# Patient Record
Sex: FEMALE | Race: WHITE | NOT HISPANIC OR LATINO | Employment: FULL TIME | URBAN - METROPOLITAN AREA
[De-identification: names, ages, dates, MRNs, and addresses within clinical notes are randomized per-mention and may not be internally consistent; named-entity substitution may affect disease eponyms.]

---

## 2017-02-13 ENCOUNTER — ALLSCRIPTS OFFICE VISIT (OUTPATIENT)
Dept: OTHER | Facility: OTHER | Age: 42
End: 2017-02-13

## 2017-02-15 LAB
HPV 18 (HISTORICAL): NOT DETECTED
HPV HIGH RISK 16/18 (HISTORICAL): NOT DETECTED
HPV16 (HISTORICAL): NOT DETECTED
PAP (HISTORICAL): NORMAL

## 2018-01-09 NOTE — RESULT NOTES
Verified Results  (1) EXPOSURE PANEL - FOLLOW UP [id number] 07:01AM Stevie Singh Seat     Test Name Result Flag Reference   HIV 1/2 AND P24 Non-Reactive  Non-Reactive   This test detects HIV 1, HIV2 and p24 Antigen

## 2018-01-12 NOTE — RESULT NOTES
Verified Results  (1) EXPOSURE PANEL - FOLLOW UP [id number] 07:01AM Geovany Singh Miners     Test Name Result Flag Reference   ALT (SGPT) 27 U/L  12-78     (1) EXPOSURE PANEL - FOLLOW UP [id number] 07:01AM Geovany Singh [facility]     Test Name Result Flag Reference   HEPATITIS C ANTIBODY Non-reactive  Non-reactive   HEPATITIS B SURFACE ANTIGEN Non-reactive  Non-reactive, NonReactive - Confirmed

## 2018-01-13 VITALS
WEIGHT: 176 LBS | BODY MASS INDEX: 32.39 KG/M2 | SYSTOLIC BLOOD PRESSURE: 136 MMHG | HEIGHT: 62 IN | DIASTOLIC BLOOD PRESSURE: 90 MMHG

## 2018-01-13 NOTE — RESULT NOTES
Verified Results  (1) EXPOSURE PANEL - FOLLOW UP [id number] 01:30PM Delmonico, Deyanne Hashimoto     Test Name Result Flag Reference   ALT (SGPT) 20 U/L  12-78

## 2018-01-13 NOTE — RESULT NOTES
Verified Results  (1) EXPOSURE PANEL - FOLLOW UP [id number] 10:00AM Juan Pablo Singh     Test Name Result Flag Reference   HIV 1/2 AND P24 Non-reactive  Non-reactive   This test detects HIV 1, HIV2 and p24 Antigen

## 2018-01-15 NOTE — RESULT NOTES
Verified Results  (1) EXPOSURE PANEL - FOLLOW UP [id number] 10:00AM Yaya Singh     Test Name Result Flag Reference   ALT (SGPT) 20 U/L  12-78     (1) EXPOSURE PANEL - FOLLOW UP [id number] 10:00AM Yaya Singh     Test Name Result Flag Reference   HEPATITIS B SURFACE ANTIGEN Non-reactive  Non-reactive, NonReactive - Confirmed     (1) EXPOSURE PANEL - FOLLOW UP [id number] 10:00AM Yaya Singh     Test Name Result Flag Reference   HEPATITIS C ANTIBODY Non-reactive  Non-reactive

## 2018-01-18 NOTE — RESULT NOTES
Verified Results  (1) EXPOSURE PANEL - FOLLOW UP [id number] 01:30PM Delmonico, Deberah Joeayne     Test Name Result Flag Reference   HEPATITIS B SURFACE ANTIGEN Non-reactive  Non-reactive, NonReactive - Confirmed     (1) EXPOSURE PANEL - FOLLOW UP [id number] 01:30PM Delmonico, Deberah Cockayne     Test Name Result Flag Reference   HEPATITIS C ANTIBODY Non-reactive  Non-reactive     (1) EXPOSURE PANEL - FOLLOW UP [id number] 01:30PM Delricardo, Ming Diazayne   This test detects HIV 1, HIV2 and p24 Antigen       Test Name Result Flag Reference   HIV 1/2 AND P24 Non-reactive  Non-reactive

## 2018-03-26 ENCOUNTER — ANNUAL EXAM (OUTPATIENT)
Dept: OBGYN CLINIC | Facility: CLINIC | Age: 43
End: 2018-03-26
Payer: COMMERCIAL

## 2018-03-26 VITALS — SYSTOLIC BLOOD PRESSURE: 132 MMHG | WEIGHT: 174 LBS | DIASTOLIC BLOOD PRESSURE: 80 MMHG | BODY MASS INDEX: 31.83 KG/M2

## 2018-03-26 DIAGNOSIS — Z12.39 SCREENING FOR MALIGNANT NEOPLASM OF BREAST: ICD-10-CM

## 2018-03-26 DIAGNOSIS — N92.1 MENORRHAGIA WITH IRREGULAR CYCLE: ICD-10-CM

## 2018-03-26 DIAGNOSIS — N94.3 PMS (PREMENSTRUAL SYNDROME): ICD-10-CM

## 2018-03-26 DIAGNOSIS — Z01.419 ENCOUNTER FOR GYNECOLOGICAL EXAMINATION: ICD-10-CM

## 2018-03-26 DIAGNOSIS — Z12.4 PAP SMEAR FOR CERVICAL CANCER SCREENING: Primary | ICD-10-CM

## 2018-03-26 PROCEDURE — S0612 ANNUAL GYNECOLOGICAL EXAMINA: HCPCS | Performed by: OBSTETRICS & GYNECOLOGY

## 2018-03-26 RX ORDER — TRANEXAMIC ACID 650 1/1
2 TABLET ORAL 3 TIMES DAILY PRN
Qty: 30 TABLET | Refills: 6 | Status: SHIPPED | OUTPATIENT
Start: 2018-03-26 | End: 2019-04-01 | Stop reason: SDUPTHER

## 2018-03-26 RX ORDER — ALPRAZOLAM 0.25 MG/1
0.25 TABLET ORAL 2 TIMES DAILY PRN
Qty: 15 TABLET | Refills: 0 | Status: SHIPPED | OUTPATIENT
Start: 2018-03-26 | End: 2018-07-09 | Stop reason: SDUPTHER

## 2018-03-26 NOTE — PROGRESS NOTES
Subjective      Nikolai Brewer is a 38 y o  female  who presents for annual well woman exam  Periods are relatively regular every 28-32 days  Patient has had very significant symptoms with sees significant PMS poor sleep prior to her menses feeling that her brain is foggy increased fibrocystic breast discomfort  Menses are also getting heavier with clots and cramps  Patient counseled on multiple options patient prefers no OCPs due to concerns for breast cancer fully counseled, had a history of serotonin reaction to Zoloft, counseled on NSAIDs as well as Lysteda and Omega 3 supplements as well as getting blood work discussed also ablation and fully counseled  No intermenstrual bleeding, spotting, or discharge  Patient reports Yes  hot flashes/night sweats, No pain problems intercourse, No vaginal dryness, sleeping poorly      Current contraception: tubal ligation  History of abnormal Pap smear: yes - had 1 abnormal in the past with normal repeat   Family history of uterine or ovarian cancer: no  Regular self breast exam: yes  History of abnormal mammogram: no  Family history of breast cancer: no    Menstrual History:  OB History      Para Term  AB Living    2 2            SAB TAB Ectopic Multiple Live Births                     Obstetric Comments    Menarche-Age 8         Menarche age: 8    LMP  Mar 20, 2018    The following portions of the patient's history were reviewed and updated as appropriate: allergies, current medications, past family history, past medical history, past social history, past surgical history and problem list   Past Medical History:   Diagnosis Date    Breast disorder     Depression     Disease of thyroid gland     Hyperlipidemia     Other abnormal findings in urine 10/25/2006    Pancreatic pseudocyst 2011    Peptic ulcer     last assessed 16    Viral gastroenteritis 2010     Past Surgical History:   Procedure Laterality Date     SECTION      TUBAL LIGATION       OB History      Para Term  AB Living    2 2            SAB TAB Ectopic Multiple Live Births                     Obstetric Comments    Menarche-Age 13          Review of Systems  Review of Systems   Constitutional: Negative for fatigue, fever and unexpected weight change  HENT: Negative for dental problem, mouth sores, nosebleeds, rhinorrhea, sinus pain, sinus pressure and sore throat  Eyes: Negative for pain, discharge and visual disturbance  Respiratory: Negative for cough, chest tightness, shortness of breath and wheezing  Cardiovascular: Negative for chest pain, palpitations and leg swelling  Gastrointestinal: Negative for blood in stool, constipation, diarrhea, nausea and vomiting  Endocrine: Positive for heat intolerance  Negative for polydipsia  Genitourinary: Positive for menstrual problem  Negative for difficulty urinating, dyspareunia, dysuria, pelvic pain, urgency, vaginal discharge and vaginal pain  Musculoskeletal: Negative for arthralgias, back pain and joint swelling  Allergic/Immunologic: Negative for environmental allergies  Neurological: Negative for seizures, light-headedness and headaches  Migraines   Hematological: Does not bruise/bleed easily  Psychiatric/Behavioral: Negative for sleep disturbance  The patient is nervous/anxious           PMS             Objective         Vitals:    18 0800   BP: 132/80         General:   alert and oriented, in no acute distress, alert, appears stated age and cooperative   Heart: regular rate and rhythm, S1, S2 normal, no murmur, click, rub or gallop   Lungs: clear to auscultation bilaterally   Abdomen: soft, non-tender, without masses or organomegaly   Vulva: normal   Vagina: normal mucosa   Cervix: no cervical motion tenderness and no lesions   Uterus: normal size, mobile, non-tender   Adnexa: no mass, fullness, tenderness   inspection negative, no nipple discharge or bleeding, no masses or nodularity palpable  deferred  Pupils equal round reactive to light and accommodation  Throat clear no lesions or findings  Thyroid normal no masses or nodules            Assessment   38year-old  with heavy menses with clots and cramps as well as PMS and mood swings  Patient also feels that her thyroid symptoms were worse after stopping her metoprolol patient has a history of hot flashes palpitations and X up the most which had improved on the metoprolol  Patient has restarted it and we reviewed going for blood work she has also noted some jama menopausal symptoms like mood swings hair growth poor sleep  Plan   Thin prep with Co testing performed, patient given slip for CBC diff, TSH, free T4, iron, ferritin, TPO AB, bursae 3, TIBC, FSH, LH, free testosterone  Patient had a normal CMP not long ago  Patient also given slip for screening mammogram with 3D and CAD  Patient given script for 15 tablets of Xanax to use until her symptoms are improved and as needed  Patient also given script for Lysteda patient to call and return if needs further intervention regarding menses and PMS  Otherwise patient should return in 1 year

## 2018-03-28 LAB
CYTOLOGIST CVX/VAG CYTO: NORMAL
DX ICD CODE: NORMAL
HPV I/H RISK 1 DNA CVX QL PROBE+SIG AMP: NEGATIVE
OTHER STN SPEC: NORMAL
PATH REPORT.FINAL DX SPEC: NORMAL
SL AMB NOTE:: NORMAL
SL AMB SPECIMEN ADEQUACY: NORMAL

## 2018-03-30 ENCOUNTER — HOSPITAL ENCOUNTER (OUTPATIENT)
Dept: MAMMOGRAPHY | Facility: HOSPITAL | Age: 43
Discharge: HOME | End: 2018-03-30
Attending: OBSTETRICS & GYNECOLOGY
Payer: COMMERCIAL

## 2018-03-30 DIAGNOSIS — Z12.39 SCREENING FOR MALIGNANT NEOPLASM OF BREAST: ICD-10-CM

## 2018-03-30 LAB
BASOPHILS # BLD AUTO: 0 X10E3/UL (ref 0–0.2)
BASOPHILS NFR BLD AUTO: 1 %
EOSINOPHIL # BLD AUTO: 0.1 X10E3/UL (ref 0–0.4)
EOSINOPHIL NFR BLD AUTO: 2 %
ERYTHROCYTE [DISTWIDTH] IN BLOOD BY AUTOMATED COUNT: 14.1 % (ref 12.3–15.4)
FSH SERPL-ACNC: 5.2 MIU/ML
HCT VFR BLD AUTO: 37.6 % (ref 34–46.6)
HGB BLD-MCNC: 12.2 G/DL (ref 11.1–15.9)
IMM GRANULOCYTES # BLD: 0 X10E3/UL (ref 0–0.1)
IMM GRANULOCYTES NFR BLD: 0 %
IRON SATN MFR SERPL: 12 % (ref 15–55)
IRON SERPL-MCNC: 49 UG/DL (ref 27–159)
LH SERPL-ACNC: 4.7 MIU/ML
LYMPHOCYTES # BLD AUTO: 1.8 X10E3/UL (ref 0.7–3.1)
LYMPHOCYTES NFR BLD AUTO: 29 %
MCH RBC QN AUTO: 27.5 PG (ref 26.6–33)
MCHC RBC AUTO-ENTMCNC: 32.4 G/DL (ref 31.5–35.7)
MCV RBC AUTO: 85 FL (ref 79–97)
MONOCYTES # BLD AUTO: 0.4 X10E3/UL (ref 0.1–0.9)
MONOCYTES NFR BLD AUTO: 6 %
NEUTROPHILS # BLD AUTO: 3.8 X10E3/UL (ref 1.4–7)
NEUTROPHILS NFR BLD AUTO: 62 %
PLATELET # BLD AUTO: 304 X10E3/UL (ref 150–379)
RBC # BLD AUTO: 4.43 X10E6/UL (ref 3.77–5.28)
T3REVERSE SERPL-MCNC: 14.2 NG/DL (ref 9.2–24.1)
T4 FREE SERPL-MCNC: 1.05 NG/DL (ref 0.82–1.77)
TESTOST FREE SERPL-MCNC: 2.6 PG/ML (ref 0–4.2)
TESTOST SERPL-MCNC: 20 NG/DL (ref 8–48)
THYROPEROXIDASE AB SERPL-ACNC: 15 IU/ML (ref 0–34)
TIBC SERPL-MCNC: 394 UG/DL (ref 250–450)
TSH SERPL DL<=0.005 MIU/L-ACNC: 2.09 UIU/ML (ref 0.45–4.5)
UIBC SERPL-MCNC: 345 UG/DL (ref 131–425)
WBC # BLD AUTO: 6.1 X10E3/UL (ref 3.4–10.8)

## 2018-03-30 PROCEDURE — 77063 BREAST TOMOSYNTHESIS BI: CPT

## 2018-03-30 PROCEDURE — 77067 SCR MAMMO BI INCL CAD: CPT

## 2018-07-09 ENCOUNTER — TELEPHONE (OUTPATIENT)
Dept: OBGYN CLINIC | Facility: CLINIC | Age: 43
End: 2018-07-09

## 2018-07-09 DIAGNOSIS — N94.3 PMS (PREMENSTRUAL SYNDROME): ICD-10-CM

## 2018-07-09 NOTE — TELEPHONE ENCOUNTER
Had annual and got trial of xanax 0 25 for pms symptoms and looking for refill  20 pills lasted last few months

## 2018-07-11 RX ORDER — ALPRAZOLAM 0.25 MG/1
0.25 TABLET ORAL 2 TIMES DAILY PRN
Qty: 15 TABLET | Refills: 0 | Status: SHIPPED | OUTPATIENT
Start: 2018-07-11 | End: 2019-04-01 | Stop reason: SDUPTHER

## 2018-07-24 ENCOUNTER — TELEPHONE (OUTPATIENT)
Dept: OBGYN CLINIC | Facility: CLINIC | Age: 43
End: 2018-07-24

## 2018-07-24 NOTE — TELEPHONE ENCOUNTER
Calling about xanax prescription to [facility]  Pharmacy does not have the fill   Did it go through or do I need to come  rx

## 2018-12-27 DIAGNOSIS — Z12.39 SCREENING FOR MALIGNANT NEOPLASM OF BREAST: Primary | ICD-10-CM

## 2019-04-01 ENCOUNTER — ANNUAL EXAM (OUTPATIENT)
Dept: OBGYN CLINIC | Facility: CLINIC | Age: 44
End: 2019-04-01
Payer: COMMERCIAL

## 2019-04-01 VITALS
WEIGHT: 177 LBS | HEIGHT: 65 IN | BODY MASS INDEX: 29.49 KG/M2 | DIASTOLIC BLOOD PRESSURE: 70 MMHG | SYSTOLIC BLOOD PRESSURE: 120 MMHG

## 2019-04-01 DIAGNOSIS — N92.1 MENORRHAGIA WITH IRREGULAR CYCLE: ICD-10-CM

## 2019-04-01 DIAGNOSIS — N94.3 PMS (PREMENSTRUAL SYNDROME): ICD-10-CM

## 2019-04-01 DIAGNOSIS — Z01.419 ENCOUNTER FOR GYNECOLOGICAL EXAMINATION WITHOUT ABNORMAL FINDING: Primary | ICD-10-CM

## 2019-04-01 PROCEDURE — 99396 PREV VISIT EST AGE 40-64: CPT | Performed by: OBSTETRICS & GYNECOLOGY

## 2019-04-01 RX ORDER — ROSUVASTATIN CALCIUM 5 MG/1
5 TABLET, COATED ORAL DAILY
COMMUNITY
Start: 2018-10-09 | End: 2021-09-13

## 2019-04-01 RX ORDER — ALPRAZOLAM 0.25 MG/1
0.25 TABLET ORAL 2 TIMES DAILY PRN
Qty: 15 TABLET | Refills: 0 | Status: SHIPPED | OUTPATIENT
Start: 2019-04-01 | End: 2019-08-09 | Stop reason: SDUPTHER

## 2019-04-01 RX ORDER — TRANEXAMIC ACID 650 1/1
2 TABLET ORAL 3 TIMES DAILY PRN
Qty: 30 TABLET | Refills: 6 | Status: SHIPPED | OUTPATIENT
Start: 2019-04-01 | End: 2020-06-01 | Stop reason: SDUPTHER

## 2019-04-02 LAB
CYTOLOGIST CVX/VAG CYTO: NORMAL
DX ICD CODE: NORMAL
OTHER STN SPEC: NORMAL
OTHER STN SPEC: NORMAL
PATH REPORT.FINAL DX SPEC: NORMAL
SL AMB NOTE:: NORMAL
SL AMB SPECIMEN ADEQUACY: NORMAL
SL AMB TEST METHODOLOGY: NORMAL

## 2019-05-07 ENCOUNTER — TRANSCRIBE ORDERS (OUTPATIENT)
Dept: ADMINISTRATIVE | Facility: HOSPITAL | Age: 44
End: 2019-05-07

## 2019-05-07 DIAGNOSIS — R10.11 ABDOMINAL PAIN, RIGHT UPPER QUADRANT: Primary | ICD-10-CM

## 2019-05-08 ENCOUNTER — HOSPITAL ENCOUNTER (OUTPATIENT)
Dept: RADIOLOGY | Facility: HOSPITAL | Age: 44
Discharge: HOME | End: 2019-05-08
Attending: FAMILY MEDICINE
Payer: COMMERCIAL

## 2019-05-08 DIAGNOSIS — R10.11 ABDOMINAL PAIN, RIGHT UPPER QUADRANT: ICD-10-CM

## 2019-05-08 PROCEDURE — 76705 ECHO EXAM OF ABDOMEN: CPT

## 2019-06-04 ENCOUNTER — HOSPITAL ENCOUNTER (OUTPATIENT)
Dept: RADIOLOGY | Facility: HOSPITAL | Age: 44
Discharge: HOME | End: 2019-06-04
Attending: OBSTETRICS & GYNECOLOGY
Payer: COMMERCIAL

## 2019-06-04 VITALS — HEIGHT: 62 IN | BODY MASS INDEX: 31.65 KG/M2 | WEIGHT: 172 LBS

## 2019-06-04 DIAGNOSIS — Z12.39 SCREENING FOR MALIGNANT NEOPLASM OF BREAST: ICD-10-CM

## 2019-06-04 PROCEDURE — 77063 BREAST TOMOSYNTHESIS BI: CPT

## 2019-06-04 PROCEDURE — 77067 SCR MAMMO BI INCL CAD: CPT

## 2019-06-05 ENCOUNTER — HOSPITAL ENCOUNTER (OUTPATIENT)
Dept: RADIOLOGY | Facility: HOSPITAL | Age: 44
Discharge: HOME | End: 2019-06-05
Payer: COMMERCIAL

## 2019-06-05 VITALS — BODY MASS INDEX: 31.46 KG/M2 | WEIGHT: 172 LBS

## 2019-06-05 DIAGNOSIS — R92.8 ABNORMALITY OF LEFT BREAST ON SCREENING MAMMOGRAM: Primary | ICD-10-CM

## 2019-06-05 DIAGNOSIS — R92.8 ABNORMALITY OF RIGHT BREAST ON SCREENING MAMMOGRAM: ICD-10-CM

## 2019-06-05 DIAGNOSIS — R10.9 ABDOMINAL PAIN: ICD-10-CM

## 2019-06-05 PROCEDURE — 78227 HEPATOBIL SYST IMAGE W/DRUG: CPT

## 2019-06-05 PROCEDURE — A9537 TC99M MEBROFENIN: HCPCS

## 2019-06-10 ENCOUNTER — HOSPITAL ENCOUNTER (OUTPATIENT)
Dept: ULTRASOUND IMAGING | Facility: CLINIC | Age: 44
Discharge: HOME | End: 2019-06-10
Attending: OBSTETRICS & GYNECOLOGY
Payer: COMMERCIAL

## 2019-06-10 ENCOUNTER — HOSPITAL ENCOUNTER (OUTPATIENT)
Dept: MAMMOGRAPHY | Facility: CLINIC | Age: 44
Discharge: HOME | End: 2019-06-10
Attending: OBSTETRICS & GYNECOLOGY
Payer: COMMERCIAL

## 2019-06-10 VITALS — HEIGHT: 62 IN | BODY MASS INDEX: 31.65 KG/M2 | WEIGHT: 172 LBS

## 2019-06-10 DIAGNOSIS — R92.8 ABNORMALITY OF RIGHT BREAST ON SCREENING MAMMOGRAM: ICD-10-CM

## 2019-06-10 PROCEDURE — 77065 DX MAMMO INCL CAD UNI: CPT

## 2019-06-10 PROCEDURE — 76642 ULTRASOUND BREAST LIMITED: CPT

## 2019-06-10 PROCEDURE — G0279 TOMOSYNTHESIS, MAMMO: HCPCS

## 2019-08-09 DIAGNOSIS — N94.3 PMS (PREMENSTRUAL SYNDROME): ICD-10-CM

## 2019-08-09 RX ORDER — ALPRAZOLAM 0.25 MG/1
0.25 TABLET ORAL 2 TIMES DAILY PRN
Qty: 15 TABLET | Refills: 0 | Status: SHIPPED | OUTPATIENT
Start: 2019-08-09 | End: 2020-06-01 | Stop reason: SDUPTHER

## 2020-03-05 ENCOUNTER — TRANSCRIBE ORDERS (OUTPATIENT)
Dept: ADMINISTRATIVE | Facility: HOSPITAL | Age: 45
End: 2020-03-05

## 2020-03-05 DIAGNOSIS — Z12.31 ENCOUNTER FOR SCREENING MAMMOGRAM FOR MALIGNANT NEOPLASM OF BREAST: Primary | ICD-10-CM

## 2020-06-01 DIAGNOSIS — N94.3 PMS (PREMENSTRUAL SYNDROME): ICD-10-CM

## 2020-06-01 DIAGNOSIS — Z12.39 SCREENING FOR MALIGNANT NEOPLASM OF BREAST: Primary | ICD-10-CM

## 2020-06-01 DIAGNOSIS — N92.1 MENORRHAGIA WITH IRREGULAR CYCLE: ICD-10-CM

## 2020-06-01 RX ORDER — TRANEXAMIC ACID 650 1/1
2 TABLET ORAL 3 TIMES DAILY PRN
Qty: 30 TABLET | Refills: 6 | Status: SHIPPED | OUTPATIENT
Start: 2020-06-01 | End: 2021-07-20 | Stop reason: SDUPTHER

## 2020-06-01 RX ORDER — ALPRAZOLAM 0.25 MG/1
0.25 TABLET ORAL 2 TIMES DAILY PRN
Qty: 15 TABLET | Refills: 0 | Status: SHIPPED | OUTPATIENT
Start: 2020-06-01 | End: 2021-01-11 | Stop reason: SDUPTHER

## 2020-07-08 ENCOUNTER — TRANSCRIBE ORDERS (OUTPATIENT)
Dept: ADMINISTRATIVE | Facility: HOSPITAL | Age: 45
End: 2020-07-08

## 2020-07-08 ENCOUNTER — HOSPITAL ENCOUNTER (OUTPATIENT)
Dept: MAMMOGRAPHY | Facility: HOSPITAL | Age: 45
Discharge: HOME | End: 2020-07-08
Attending: OBSTETRICS & GYNECOLOGY
Payer: COMMERCIAL

## 2020-07-08 VITALS — WEIGHT: 172 LBS | BODY MASS INDEX: 31.65 KG/M2 | HEIGHT: 62 IN

## 2020-07-08 DIAGNOSIS — Z12.31 ENCOUNTER FOR SCREENING MAMMOGRAM FOR MALIGNANT NEOPLASM OF BREAST: ICD-10-CM

## 2020-07-08 PROCEDURE — 77063 BREAST TOMOSYNTHESIS BI: CPT

## 2020-07-08 PROCEDURE — 77067 SCR MAMMO BI INCL CAD: CPT

## 2020-08-06 ENCOUNTER — ANNUAL EXAM (OUTPATIENT)
Dept: OBGYN CLINIC | Facility: CLINIC | Age: 45
End: 2020-08-06
Payer: COMMERCIAL

## 2020-08-06 VITALS
WEIGHT: 176 LBS | BODY MASS INDEX: 32.19 KG/M2 | DIASTOLIC BLOOD PRESSURE: 90 MMHG | TEMPERATURE: 98.7 F | SYSTOLIC BLOOD PRESSURE: 140 MMHG

## 2020-08-06 DIAGNOSIS — Z01.419 ENCOUNTER FOR GYNECOLOGICAL EXAMINATION WITHOUT ABNORMAL FINDING: Primary | ICD-10-CM

## 2020-08-06 DIAGNOSIS — Z12.39 SCREENING FOR MALIGNANT NEOPLASM OF BREAST: ICD-10-CM

## 2020-08-06 PROCEDURE — 99396 PREV VISIT EST AGE 40-64: CPT | Performed by: OBSTETRICS & GYNECOLOGY

## 2020-08-06 NOTE — PROGRESS NOTES
Subjective      Kiki Galvan is a 45 y o   female who presents for annual well woman exam   Patient's son is in high school and her daughter is in 8th grade  Periods are regular every 28-30 days, lasting 7 days  somewhat heavy and crampy but overall tolerable discussed again options to treat  Patient reports omega-3 supplement is helping with PMS  No intermenstrual bleeding, spotting, or discharge  Patient reports Yes  hot flashes/night sweats, No pain problems intercourse, Yes  vaginal dryness, sleeping fairly well  Current contraception: tubal ligation  History of abnormal Pap smear: yes - had 1 abnormal in the past with normal repeat   Family history of uterine or ovarian cancer: no  Regular self breast exam: yes  History of abnormal mammogram: no  Family history of breast cancer: no    Menstrual History:  OB History        4    Para   4    Term   2            AB        Living           SAB        TAB        Ectopic        Multiple        Live Births               Obstetric Comments   Menarche-Age 8            Menarche age: 8  No LMP recorded         The following portions of the patient's history were reviewed and updated as appropriate: allergies, current medications, past family history, past medical history, past social history, past surgical history and problem list   Past Medical History:   Diagnosis Date    Breast disorder     Depression     Disease of thyroid gland     Hyperlipidemia     Other abnormal findings in urine 10/25/2006    Pancreatic pseudocyst 2011    Peptic ulcer     last assessed 16    Viral gastroenteritis 2010     Past Surgical History:   Procedure Laterality Date     SECTION      TUBAL LIGATION       OB History        4    Para   4    Term   2            AB        Living           SAB        TAB        Ectopic        Multiple        Live Births               Obstetric Comments   Menarche-Age 13             Review of Systems  Review of Systems   Constitutional: Negative for fatigue, fever and unexpected weight change  HENT: Negative for dental problem, sinus pressure and sinus pain  Eyes: Negative for visual disturbance  Respiratory: Negative for cough, shortness of breath and wheezing  Cardiovascular: Negative for chest pain and leg swelling  Gastrointestinal: Negative for blood in stool, constipation, diarrhea, nausea and vomiting  Endocrine: Negative for cold intolerance, heat intolerance and polydipsia  Genitourinary: Negative for dysuria, frequency, hematuria, menstrual problem and pelvic pain  Musculoskeletal: Negative for arthralgias and back pain  Neurological: Negative for dizziness, seizures and headaches  Psychiatric/Behavioral: The patient is not nervous/anxious  Objective     Vitals:    20 0907   BP: 140/90   Temp: 98 7 °F (37 1 °C)   Weight: 79 8 kg (176 lb)       General:   alert and oriented, in no acute distress   Heart: regular rate and rhythm, S1, S2 normal, no murmur, click, rub or gallop   Lungs: clear to auscultation bilaterally   Abdomen: soft, non-tender, without masses or organomegaly   Vulva: normal   Vagina: normal mucosa   Cervix: no bleeding following Pap, no cervical motion tenderness and no lesions   Uterus: normal size, mobile, non-tender   Adnexa: normal adnexa and no mass, fullness, tenderness   Breast inspection negative, no nipple discharge or bleeding, no masses or nodularity palpable  Rectal negative, stool guaiac negative  Thyroid normal no masses or nodules     Assessment   45year-old  here for annual exam started some Crestor overall doing well menses slightly heavy but overall controlled       Plan   Thin prep with Co testing performed, given slip for mammogram, patient will call if she needs additional Xanax use is very limited return in 1 year or sooner as needed

## 2020-08-08 LAB
CYTOLOGIST CVX/VAG CYTO: NORMAL
DX ICD CODE: NORMAL
HPV I/H RISK 1 DNA CVX QL PROBE+SIG AMP: NEGATIVE
HPV LOW RISK DNA CVX QL PROBE+SIG AMP: NEGATIVE
OTHER STN SPEC: NORMAL
PATH REPORT.FINAL DX SPEC: NORMAL
SL AMB NOTE:: NORMAL
SL AMB SPECIMEN ADEQUACY: NORMAL
SL AMB TEST METHODOLOGY: NORMAL

## 2021-01-11 DIAGNOSIS — N94.3 PMS (PREMENSTRUAL SYNDROME): ICD-10-CM

## 2021-01-11 RX ORDER — ALPRAZOLAM 0.25 MG/1
0.25 TABLET ORAL 2 TIMES DAILY PRN
Qty: 15 TABLET | Refills: 0 | Status: SHIPPED | OUTPATIENT
Start: 2021-01-11 | End: 2021-07-20 | Stop reason: SDUPTHER

## 2021-01-11 NOTE — TELEPHONE ENCOUNTER
Patient called stating she needs a refill on her Xanax that she takes as needed for hormones      CMA

## 2021-07-20 DIAGNOSIS — N92.1 MENORRHAGIA WITH IRREGULAR CYCLE: ICD-10-CM

## 2021-07-20 DIAGNOSIS — N94.3 PMS (PREMENSTRUAL SYNDROME): ICD-10-CM

## 2021-07-20 RX ORDER — ALPRAZOLAM 0.25 MG/1
0.25 TABLET ORAL 2 TIMES DAILY PRN
Qty: 15 TABLET | Refills: 0 | Status: SHIPPED | OUTPATIENT
Start: 2021-07-20 | End: 2022-07-21 | Stop reason: SDUPTHER

## 2021-07-20 RX ORDER — TRANEXAMIC ACID 650 1/1
1300 TABLET ORAL 3 TIMES DAILY PRN
Qty: 30 TABLET | Refills: 1 | Status: SHIPPED | OUTPATIENT
Start: 2021-07-20 | End: 2022-07-21 | Stop reason: SDUPTHER

## 2021-09-13 ENCOUNTER — ANNUAL EXAM (OUTPATIENT)
Dept: OBGYN CLINIC | Facility: CLINIC | Age: 46
End: 2021-09-13
Payer: COMMERCIAL

## 2021-09-13 DIAGNOSIS — Z01.419 ROUTINE GYNECOLOGICAL EXAMINATION: Primary | ICD-10-CM

## 2021-09-13 DIAGNOSIS — Z12.4 ENCOUNTER FOR PAP SMEAR OF CERVIX WITH HPV DNA COTESTING: ICD-10-CM

## 2021-09-13 PROCEDURE — 99396 PREV VISIT EST AGE 40-64: CPT | Performed by: OBSTETRICS & GYNECOLOGY

## 2021-09-13 NOTE — PROGRESS NOTES
Alessandro Bolivar is a 39 y o   female who presents for annual well woman exam   Her son is a senior in high school and her daughter is a freshman in high school  Periods are regular every 21 days, lasting 7 days  Somewhat heavy, managing pms with xanax and heavy bleeding with TXA  Aware of additional options to address symptoms  Patient has migraines with Aura, discussed would not recommend estrogen products, has follow up with neurology  No intermenstrual bleeding, spotting, or discharge  Patient reports Yes  hot flashes/night sweats, Yes  pain problems intercourse, Yes  vaginal dryness reviewed lubricants and options, sleeping not as well  Has decreased weight using weight watchers  Patient counseled on guidelines, has h/o abnormal and strongly desires pap today  Current contraception: tubal ligation  History of abnormal Pap smear: yes - h/o abnormal in the past, recent normal  Family history of uterine or ovarian cancer: no  Regular self breast exam: yes  History of abnormal mammogram: no  Family history of breast cancer: no    Menstrual History:  OB History        4    Para   4    Term   2            AB        Living           SAB        TAB        Ectopic        Multiple        Live Births               Obstetric Comments   Menarche-Age 8            Menarche age: 8  Patient's last menstrual period was 2021 (approximate)         The following portions of the patient's history were reviewed and updated as appropriate: allergies, current medications, past family history, past medical history, past social history, past surgical history and problem list   Past Medical History:   Diagnosis Date    Breast disorder     Depression     Disease of thyroid gland     Hyperlipidemia     Other abnormal findings in urine 10/25/2006    Pancreatic pseudocyst 2011    Peptic ulcer     last assessed 16    Viral gastroenteritis 2010     Past Surgical History:   Procedure Laterality Date     SECTION      CHOLECYSTECTOMY  2019    TUBAL LIGATION       OB History        4    Para   4    Term   2            AB        Living           SAB        TAB        Ectopic        Multiple        Live Births               Obstetric Comments   Menarche-Age 13             Review of Systems  Review of Systems   Constitutional: Negative for chills and fever  HENT: Negative for ear pain and sore throat  Eyes: Negative for pain and visual disturbance  Respiratory: Negative for cough and shortness of breath  Cardiovascular: Negative for chest pain and palpitations  Gastrointestinal: Negative for abdominal pain and vomiting  Genitourinary: Positive for menstrual problem  Negative for dysuria and hematuria  Heavy periods   Musculoskeletal: Negative for arthralgias and back pain  Skin: Negative for color change and rash  Neurological: Negative for seizures and syncope  All other systems reviewed and are negative              Objective      Temp (!) 97 4 °F (36 3 °C) (Temporal)   Ht 5' 2" (1 575 m)   Wt 74 8 kg (165 lb)   LMP 2021 (Approximate)   BMI 30 18 kg/m²   Vitals:    21 1352   Temp: (!) 97 4 °F (36 3 °C)   TempSrc: Temporal   Weight: 74 8 kg (165 lb)   Height: 5' 2" (1 575 m)   BP: 130/78    General:   alert and oriented, in no acute distress   Heart: regular rate and rhythm, S1, S2 normal, no murmur, click, rub or gallop   Lungs: clear to auscultation bilaterally   Abdomen: soft, non-tender, without masses or organomegaly   Vulva: normal   Vagina: normal mucosa   Cervix: no bleeding following Pap, no cervical motion tenderness and no lesions   Uterus: normal size, mobile, non-tender   Adnexa: normal adnexa and no mass, fullness, tenderness   Breast inspection negative, no nipple discharge or bleeding, no masses or nodularity palpable  Rectal negative, stool guaiac negative  Thyroid normal no masses or nodules     Assessment   38 y/o  here for annual, menses somewhat heavier and closer together, some PMS, managing with xanax rarely as needed and TXA  Has migraine with aura  Aware of options  Will consider and return for discussion if decides to proceed with surgical intervention  Last mammogram 2020, scheduled for next one  Plan   Thin prep with cotesting  Patient counseled, strongly desires pap due to h/o abnormal   Given slip for mammogram, return in 1 year or sooner as needed

## 2021-09-16 VITALS
BODY MASS INDEX: 30.36 KG/M2 | SYSTOLIC BLOOD PRESSURE: 130 MMHG | WEIGHT: 165 LBS | HEIGHT: 62 IN | DIASTOLIC BLOOD PRESSURE: 78 MMHG | TEMPERATURE: 97.4 F

## 2021-09-16 LAB
CYTOLOGIST CVX/VAG CYTO: NORMAL
DX ICD CODE: NORMAL
HPV I/H RISK 4 DNA CVX QL PROBE+SIG AMP: NEGATIVE
OTHER STN SPEC: NORMAL
PATH REPORT.FINAL DX SPEC: NORMAL
SL AMB NOTE:: NORMAL
SL AMB SPECIMEN ADEQUACY: NORMAL
SL AMB TEST METHODOLOGY: NORMAL

## 2021-11-08 ENCOUNTER — TELEPHONE (OUTPATIENT)
Dept: OBGYN CLINIC | Facility: CLINIC | Age: 46
End: 2021-11-08

## 2021-11-15 ENCOUNTER — HOSPITAL ENCOUNTER (OUTPATIENT)
Dept: MAMMOGRAPHY | Facility: HOSPITAL | Age: 46
Discharge: HOME | End: 2021-11-15
Attending: OBSTETRICS & GYNECOLOGY
Payer: COMMERCIAL

## 2021-11-15 VITALS — BODY MASS INDEX: 30.35 KG/M2 | HEIGHT: 62 IN | WEIGHT: 164.9 LBS

## 2021-11-15 DIAGNOSIS — Z12.39 SCREENING FOR MALIGNANT NEOPLASM OF BREAST: ICD-10-CM

## 2021-11-15 PROCEDURE — 77067 SCR MAMMO BI INCL CAD: CPT

## 2021-11-15 PROCEDURE — 77063 BREAST TOMOSYNTHESIS BI: CPT

## 2022-07-21 DIAGNOSIS — N94.3 PMS (PREMENSTRUAL SYNDROME): ICD-10-CM

## 2022-07-21 DIAGNOSIS — N92.1 MENORRHAGIA WITH IRREGULAR CYCLE: ICD-10-CM

## 2022-07-21 RX ORDER — TRANEXAMIC ACID 650 MG/1
1300 TABLET ORAL 3 TIMES DAILY PRN
Qty: 30 TABLET | Refills: 1 | Status: SHIPPED | OUTPATIENT
Start: 2022-07-21

## 2022-07-21 RX ORDER — ALPRAZOLAM 0.25 MG/1
0.25 TABLET ORAL 2 TIMES DAILY PRN
Qty: 15 TABLET | Refills: 0 | Status: SHIPPED | OUTPATIENT
Start: 2022-07-21

## 2022-07-21 NOTE — TELEPHONE ENCOUNTER
Pt called stating she needs refill on her tranexamic acid and Xanax - Prescribed by usually Dr Demond Correa

## 2022-09-02 DIAGNOSIS — Z12.31 ENCOUNTER FOR SCREENING MAMMOGRAM FOR BREAST CANCER: Primary | ICD-10-CM

## 2022-09-19 ENCOUNTER — ANNUAL EXAM (OUTPATIENT)
Dept: OBGYN CLINIC | Facility: CLINIC | Age: 47
End: 2022-09-19
Payer: COMMERCIAL

## 2022-09-19 VITALS — WEIGHT: 170 LBS | DIASTOLIC BLOOD PRESSURE: 92 MMHG | BODY MASS INDEX: 31.09 KG/M2 | SYSTOLIC BLOOD PRESSURE: 130 MMHG

## 2022-09-19 DIAGNOSIS — Z01.419 ROUTINE GYNECOLOGICAL EXAMINATION: Primary | ICD-10-CM

## 2022-09-19 PROCEDURE — 99396 PREV VISIT EST AGE 40-64: CPT | Performed by: OBSTETRICS & GYNECOLOGY

## 2022-09-19 PROCEDURE — 0503F POSTPARTUM CARE VISIT: CPT | Performed by: OBSTETRICS & GYNECOLOGY

## 2022-09-19 NOTE — PROGRESS NOTES
Subjective      Santhosh Nath is a 42 y o   female who presents for annual well woman exam  Periods are regular every 21 days, lasting 7 days  Controlled with Xanax and TXA  Patient is satisfied with symptom control at this time  No intermenstrual bleeding, spotting, or discharge  Patient reports Yes  hot flashes/night sweats, No pain problems intercourse, possibly mild vaginal dryness, sleeping fairly well, insomnia prior to menses  Has started Ubrelvy for migraines with improved symptom control  Her son is attending community college and doing well her daughter is in high school  Current contraception: tubal ligation  History of abnormal Pap smear: yes - history of abnormal in the past recent Sir normal  Family history of uterine or ovarian cancer: no  Regular self breast exam: yes  History of abnormal mammogram: no  Family history of breast cancer: no    Menstrual History:  OB History        4    Para   4    Term   2            AB        Living   2       SAB        IAB        Ectopic        Multiple        Live Births   2           Obstetric Comments   Menarche-Age 8            Menarche age: 8  Patient's last menstrual period was 2022 (exact date)         The following portions of the patient's history were reviewed and updated as appropriate: allergies, current medications, past family history, past medical history, past social history, past surgical history and problem list   Past Medical History:   Diagnosis Date    Breast disorder     Depression     Disease of thyroid gland     Hyperlipidemia     Other abnormal findings in urine 10/25/2006    Pancreatic pseudocyst 2011    Peptic ulcer     last assessed 16    Viral gastroenteritis 2010     Past Surgical History:   Procedure Laterality Date     SECTION      CHOLECYSTECTOMY  2019    TUBAL LIGATION       OB History        4    Para   4    Term   2            AB Living   2       SAB        IAB        Ectopic        Multiple        Live Births   2           Obstetric Comments   Menarche-Age 13             Review of Systems  Review of Systems   Constitutional: Negative for fatigue, fever and unexpected weight change  HENT: Negative for dental problem, sinus pressure and sinus pain  Eyes: Negative for visual disturbance  Respiratory: Negative for cough, shortness of breath and wheezing  Cardiovascular: Negative for chest pain and leg swelling  Gastrointestinal: Negative for blood in stool, constipation, diarrhea, nausea and vomiting  Endocrine: Negative for cold intolerance, heat intolerance and polydipsia  Genitourinary: Positive for menstrual problem  Negative for dysuria, frequency, hematuria and pelvic pain  Periods are starting to become irregular   Musculoskeletal: Negative for arthralgias and back pain  Neurological: Negative for dizziness, seizures and headaches  Psychiatric/Behavioral: The patient is not nervous/anxious                Objective     Vitals:    22 0808   BP: 130/92   BP Location: Right arm   Patient Position: Sitting   Weight: 77 1 kg (170 lb)     General:   alert and oriented, in no acute distress   Heart: regular rate and rhythm, S1, S2 normal, no murmur, click, rub or gallop   Lungs: clear to auscultation bilaterally   Abdomen: soft, non-tender, without masses or organomegaly   Vulva: normal   Vagina: normal mucosa   Cervix: no bleeding following Pap, no cervical motion tenderness and no lesions   Uterus: normal size, mobile, non-tender   Adnexa: normal adnexa and no mass, fullness, tenderness   Breast inspection negative, no nipple discharge or bleeding, no masses or nodularity palpable  Rectal negative, stool guaiac negative  Thyroid bulky no masses or nodules palpable (patient follows with her primary care physician for thyroid reported to have nodules)     Assessment   30-year-old  here for annual exam reviewed [facility]P guidelines desires Pap evaluation history of abnormal   Last Pap September 2021 normal, last mammogram November 2021 normal     Plan   Thin prep with Co testing performed, heavy menses controlled with current medication regimen, given slip for mammogram   Return in 1 year or sooner as needed

## 2022-09-24 LAB
CYTOLOGIST CVX/VAG CYTO: ABNORMAL
DX ICD CODE: ABNORMAL
HPV I/H RISK 4 DNA CVX QL PROBE+SIG AMP: POSITIVE
HPV16 DNA CVX QL PROBE+SIG AMP: NEGATIVE
HPV18+45 E6+E7 MRNA CVX QL NAA+PROBE: NEGATIVE
OTHER STN SPEC: ABNORMAL
PATH REPORT.FINAL DX SPEC: ABNORMAL
SL AMB NOTE:: ABNORMAL
SL AMB SPECIMEN ADEQUACY: ABNORMAL
SL AMB TEST METHODOLOGY: ABNORMAL

## 2023-03-08 ENCOUNTER — HOSPITAL ENCOUNTER (OUTPATIENT)
Dept: MAMMOGRAPHY | Facility: HOSPITAL | Age: 48
Discharge: HOME | End: 2023-03-08
Attending: OBSTETRICS & GYNECOLOGY

## 2023-03-08 VITALS — BODY MASS INDEX: 30.91 KG/M2 | HEIGHT: 62 IN | WEIGHT: 168 LBS

## 2023-03-08 DIAGNOSIS — Z12.31 ENCOUNTER FOR SCREENING MAMMOGRAM FOR BREAST CANCER: ICD-10-CM

## 2023-06-12 DIAGNOSIS — N92.1 MENORRHAGIA WITH IRREGULAR CYCLE: ICD-10-CM

## 2023-06-12 DIAGNOSIS — N94.3 PMS (PREMENSTRUAL SYNDROME): ICD-10-CM

## 2023-06-12 RX ORDER — TRANEXAMIC ACID 650 MG/1
1300 TABLET ORAL 3 TIMES DAILY PRN
Qty: 30 TABLET | Refills: 1 | Status: SHIPPED | OUTPATIENT
Start: 2023-06-12

## 2023-06-12 RX ORDER — ALPRAZOLAM 0.25 MG/1
0.25 TABLET ORAL 2 TIMES DAILY PRN
Qty: 15 TABLET | Refills: 0 | Status: SHIPPED | OUTPATIENT
Start: 2023-06-12

## 2023-06-12 NOTE — TELEPHONE ENCOUNTER
Temporary refill on Xanax and TXA given  Please encourage patient to schedule APE with Dr Antwan Shin in September  Thanks!

## 2023-08-28 ENCOUNTER — TELEPHONE (OUTPATIENT)
Dept: OBGYN CLINIC | Facility: CLINIC | Age: 48
End: 2023-08-28

## 2023-08-28 NOTE — TELEPHONE ENCOUNTER
Pt called and said she feels vaginal pressure, doesn't hurt but comes and goes. She noticed it within the last week.

## 2023-08-30 NOTE — TELEPHONE ENCOUNTER
Pt would like to be seen, has her annual 10/9/2023 and would like to be seen sooner for the issues she is having.

## 2023-10-09 ENCOUNTER — ANNUAL EXAM (OUTPATIENT)
Dept: OBGYN CLINIC | Facility: CLINIC | Age: 48
End: 2023-10-09
Payer: COMMERCIAL

## 2023-10-09 VITALS — BODY MASS INDEX: 32.56 KG/M2 | WEIGHT: 178 LBS | DIASTOLIC BLOOD PRESSURE: 90 MMHG | SYSTOLIC BLOOD PRESSURE: 140 MMHG

## 2023-10-09 DIAGNOSIS — R92.30 DENSE BREAST TISSUE: ICD-10-CM

## 2023-10-09 DIAGNOSIS — Z12.31 ENCOUNTER FOR SCREENING MAMMOGRAM FOR BREAST CANCER: ICD-10-CM

## 2023-10-09 DIAGNOSIS — E07.9 DISEASE OF THYROID GLAND: ICD-10-CM

## 2023-10-09 DIAGNOSIS — Z01.419 ENCOUNTER FOR GYNECOLOGICAL EXAMINATION WITHOUT ABNORMAL FINDING: Primary | ICD-10-CM

## 2023-10-09 PROCEDURE — 99396 PREV VISIT EST AGE 40-64: CPT | Performed by: OBSTETRICS & GYNECOLOGY

## 2023-10-09 NOTE — PROGRESS NOTES
Subjective      Melany Joseph is a 41 y.o. G2, P2 female who presents for annual well woman exam. Lei Hind are less regular recently with a normal menses in July then no menses in August followed by 2 weeks of bleeding  till about . Patient did have very significant hot flashes and night sweats in August but symptoms seem to be reducing again. Discussed and reviewed perimenopause. . No intermenstrual bleeding, spotting, or discharge. Patient reports Yes  hot flashes/night sweats, No pain problems intercourse, No vaginal dryness, sleeping sometimes okay sometimes not as well. Counseled and reviewed on menopause/perimenopause. We discussed options to treat based on symptoms. Patient currently without symptoms. We also discussed her mammogram was normal last March but showed dense breasts we discussed ABUS to add screening, also discussed Pap smear with positive none 1618 high risk HPV. Patient has history of abnormal Paps in the past discussed that normal cells we would continue to monitor. Her son is now a sophomore in community college and her daughter is doing well in high school. Current contraception: tubal ligation  History of abnormal Pap smear: yes -history of abnormal in the past recent with positive none 16/18 high risk HPV  Family history of uterine or ovarian cancer: no  Regular self breast exam: yes  History of abnormal mammogram: no  Family history of breast cancer: no    Menstrual History:  OB History        4    Para   4    Term   2            AB        Living   2       SAB        IAB        Ectopic        Multiple        Live Births   2           Obstetric Comments   Menarche-Age 8            Menarche age: 8  Patient's last menstrual period was 2023.        The following portions of the patient's history were reviewed and updated as appropriate: allergies, current medications, past family history, past medical history, past social history, past surgical history and problem list.  Past Medical History:   Diagnosis Date   • Breast disorder    • Depression    • Disease of thyroid gland    • Hyperlipidemia    • Other abnormal findings in urine 10/25/2006   • Pancreatic pseudocyst 2011   • Peptic ulcer     last assessed 16   • Viral gastroenteritis 2010     Past Surgical History:   Procedure Laterality Date   •  SECTION     • CHOLECYSTECTOMY  2019   • TUBAL LIGATION       OB History        4    Para   4    Term   2            AB        Living   2       SAB        IAB        Ectopic        Multiple        Live Births   2           Obstetric Comments   Menarche-Age 13             Review of Systems  Review of Systems   Constitutional: Negative for fatigue, fever and unexpected weight change. HENT: Negative for dental problem, sinus pressure and sinus pain. Eyes: Negative for visual disturbance. Respiratory: Negative for cough, shortness of breath and wheezing. Cardiovascular: Negative for chest pain and leg swelling. Gastrointestinal: Negative for blood in stool, constipation, diarrhea, nausea and vomiting. Endocrine: Negative for cold intolerance, heat intolerance and polydipsia. Genitourinary: Negative for dysuria, frequency, hematuria, menstrual problem and pelvic pain. Musculoskeletal: Negative for arthralgias and back pain. Neurological: Negative for dizziness, seizures and headaches. Psychiatric/Behavioral: The patient is not nervous/anxious.       Objective   Vitals:    10/09/23 0831   BP: 140/90   Weight: 80.7 kg (178 lb)     Repeat blood pressure at end of visit 130/82  General:   alert and oriented, in no acute distress   Heart: regular rate and rhythm, S1, S2 normal, no murmur, click, rub or gallop   Lungs: clear to auscultation bilaterally   Abdomen: soft, non-tender, without masses or organomegaly   Vulva: normal   Vagina: normal mucosa   Cervix: no bleeding following Pap, no cervical motion tenderness and no lesions   Uterus: normal size, mobile, non-tender   Adnexa: normal adnexa and no mass, fullness, tenderness   Breast inspection negative, no nipple discharge or bleeding, no masses or nodularity palpable  Rectal negative, stool guaiac negative  Thyroid normal no masses or nodules     Assessment   41year-old G4, P2 here for annual exam with some perimenopausal symptoms had increased symptoms this past month aware of other options to treat but prefers to use Xanax and TXA to control symptoms at this time of PMS and heavy menses. Last Pap September 2022 normal with positive nonsixteen 18 high risk HPV, last mammogram March 2023 normal but dense tissue     Plan   ThinPrep with cotesting performed, and slip for mammogram and ABUS for March return in 1 year or sooner as needed. Currently has recently renewed prescriptions patient to call if needs refills.

## 2023-10-13 LAB
CYTOLOGIST CVX/VAG CYTO: NORMAL
DX ICD CODE: NORMAL
HPV GENOTYPE REFLEX: NORMAL
HPV I/H RISK 4 DNA CVX QL PROBE+SIG AMP: NEGATIVE
OTHER STN SPEC: NORMAL
PATH REPORT.FINAL DX SPEC: NORMAL
SL AMB NOTE:: NORMAL
SL AMB SPECIMEN ADEQUACY: NORMAL
SL AMB TEST METHODOLOGY: NORMAL

## 2024-01-01 ENCOUNTER — PATIENT MESSAGE (OUTPATIENT)
Dept: OBGYN CLINIC | Facility: CLINIC | Age: 49
End: 2024-01-01

## 2024-01-02 ENCOUNTER — TELEPHONE (OUTPATIENT)
Dept: OBGYN CLINIC | Facility: CLINIC | Age: 49
End: 2024-01-02

## 2024-01-02 DIAGNOSIS — R53.83 OTHER FATIGUE: Primary | ICD-10-CM

## 2024-01-02 NOTE — TELEPHONE ENCOUNTER
Called and spoke to patient. Reviewed additional [employer] message encounter and ordered blood work. Patient states she will [facility] work [facility] up [facility] Patient states she has had heavy prolonged bleeding and history of heavy clotting. Since July, has been getting menses every other month. Started bleeding 11/29/23 and this has been the longest episode of bleeding yet. Has had 3 migraines in the past month, not common for her. Week 2-3 of bleeding was changing pad every hour or more. Unable to use tampons d/t clotting. Was also passing large clots during week 2-3 of bleeding. Reports body feels run down. Symptoms - fatigue, weakness, coworkers telling her she is pale, SOB on exertion. 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 Patient states she took 2 TXA pills previously prescribed 2 days ago. Since then bleeding has lessened significantly. Patient reports at this time she has not had to change her pad yet today, just some spotting noted on pad. No clots noted for the last week/week and half. Patient will complete blood work and f/u results and provider recommendations. Patient verbalizes understanding of recommendations if experiencing symptoms such as: SOB, heart palpitations, fatigue, weakness, dizziness, lightheadedness, cp, increased heavy bleeding changing fully saturated pad every hour to ED for evaluation. Patient verbalizes understanding if persistent or worsening symptoms to call office or to ED for evaluation. Patient has no additional questions at this time.

## 2024-01-02 NOTE — TELEPHONE ENCOUNTER
Patient left message on machine - patient of Dr. Adams's. Sent Dr. Adams a [employer] message yesterday. Wants to be sure she receives it to review. Started period 11/29/23 and unfortunately is still bleeding. Not as bad. Has taken TXA and down to spotting. Doesn't feel good. Wanted to know if Dr. Adams could eval for some iron deficiencies. Was in office in September and she had recommended some thyroid studies. Hasn't had it done yet so wondering if able to order for [facility]. In message requested CBC, iron studies and tsh. Wrote symptoms in message. Typical. Anemic. Feels weak, foggy headed, tongue feels funny, SOB, heart palpitating. Everything you would expect from bleeding for 4 weeks. Thought would call to see if Dr. Adams is in today to review message.

## 2024-01-04 ENCOUNTER — HOSPITAL ENCOUNTER (OUTPATIENT)
Dept: INFUSION CENTER | Facility: HOSPITAL | Age: 49
Discharge: HOME | End: 2024-01-04
Payer: COMMERCIAL

## 2024-01-04 ENCOUNTER — TELEPHONE (OUTPATIENT)
Dept: OBGYN CLINIC | Facility: CLINIC | Age: 49
End: 2024-01-04

## 2024-01-04 ENCOUNTER — TELEPHONE (OUTPATIENT)
Dept: LABOR AND DELIVERY | Facility: HOSPITAL | Age: 49
End: 2024-01-04

## 2024-01-04 VITALS
DIASTOLIC BLOOD PRESSURE: 78 MMHG | TEMPERATURE: 98.7 F | HEART RATE: 85 BPM | SYSTOLIC BLOOD PRESSURE: 132 MMHG | RESPIRATION RATE: 18 BRPM

## 2024-01-04 LAB
ABO GROUP BLD: NORMAL
BASOPHILS # BLD AUTO: 0.1 X10E3/UL (ref 0–0.2)
BASOPHILS NFR BLD AUTO: 1 %
BLD GP AB SCN SERPL QL: NEGATIVE
EOSINOPHIL # BLD AUTO: 0.2 X10E3/UL (ref 0–0.4)
EOSINOPHIL NFR BLD AUTO: 3 %
ERYTHROCYTE [DISTWIDTH] IN BLOOD BY AUTOMATED COUNT: 13.9 % (ref 11.7–15.4)
FERRITIN SERPL-MCNC: 7 NG/ML (ref 15–150)
HCT VFR BLD AUTO: 21.6 % (ref 34–46.6)
HGB BLD-MCNC: 6.7 G/DL (ref 11.1–15.9)
IMM GRANULOCYTES # BLD: 0 X10E3/UL (ref 0–0.1)
IMM GRANULOCYTES NFR BLD: 0 %
IRON SATN MFR SERPL: 4 % (ref 15–55)
IRON SERPL-MCNC: 19 UG/DL (ref 27–159)
LYMPHOCYTES # BLD AUTO: 2.1 X10E3/UL (ref 0.7–3.1)
LYMPHOCYTES NFR BLD AUTO: 33 %
MCH RBC QN AUTO: 25.3 PG (ref 26.6–33)
MCHC RBC AUTO-ENTMCNC: 31 G/DL (ref 31.5–35.7)
MCV RBC AUTO: 82 FL (ref 79–97)
MONOCYTES # BLD AUTO: 0.6 X10E3/UL (ref 0.1–0.9)
MONOCYTES NFR BLD AUTO: 9 %
NEUTROPHILS # BLD AUTO: 3.4 X10E3/UL (ref 1.4–7)
NEUTROPHILS NFR BLD AUTO: 54 %
PLATELET # BLD AUTO: 423 X10E3/UL (ref 150–450)
RBC # BLD AUTO: 2.65 X10E6/UL (ref 3.77–5.28)
RH BLD: POSITIVE
SPECIMEN EXPIRATION DATE: NORMAL
T4 FREE SERPL-MCNC: 1.05 NG/DL (ref 0.82–1.77)
TIBC SERPL-MCNC: 476 UG/DL (ref 250–450)
TSH SERPL DL<=0.005 MIU/L-ACNC: 2.92 UIU/ML (ref 0.45–4.5)
UIBC SERPL-MCNC: 457 UG/DL (ref 131–425)
WBC # BLD AUTO: 6.3 X10E3/UL (ref 3.4–10.8)

## 2024-01-04 PROCEDURE — 36430 TRANSFUSION BLD/BLD COMPNT: CPT

## 2024-01-04 PROCEDURE — 86920 COMPATIBILITY TEST SPIN: CPT

## 2024-01-04 PROCEDURE — 86900 BLOOD TYPING SEROLOGIC ABO: CPT | Performed by: OBSTETRICS & GYNECOLOGY

## 2024-01-04 PROCEDURE — P9016 RBC LEUKOCYTES REDUCED: HCPCS

## 2024-01-04 PROCEDURE — 86901 BLOOD TYPING SEROLOGIC RH(D): CPT | Performed by: OBSTETRICS & GYNECOLOGY

## 2024-01-04 PROCEDURE — 86850 RBC ANTIBODY SCREEN: CPT | Performed by: OBSTETRICS & GYNECOLOGY

## 2024-01-04 RX ORDER — DIPHENHYDRAMINE HCL 25 MG
25 TABLET ORAL ONCE
Status: COMPLETED | OUTPATIENT
Start: 2024-01-04 | End: 2024-01-04

## 2024-01-04 RX ORDER — ACETAMINOPHEN 325 MG/1
650 TABLET ORAL ONCE
Status: COMPLETED | OUTPATIENT
Start: 2024-01-04 | End: 2024-01-04

## 2024-01-04 RX ADMIN — ACETAMINOPHEN 650 MG: 325 TABLET ORAL at 10:55

## 2024-01-04 RX ADMIN — DIPHENHYDRAMINE HYDROCHLORIDE 25 MG: 25 TABLET ORAL at 10:55

## 2024-01-04 NOTE — PROGRESS NOTES
Padma Jarrell  tolerated treatment well with no complications. Pt to call Dr. Adams's office to schedule follow up.    AVS printed and given to Padma Jarrell:  Yes

## 2024-01-04 NOTE — TELEPHONE ENCOUNTER
PC to patient this am, discussed hgb and need for transfusion.  Patient reports fatigue and some lip tingling.  Discussed option of ER for transfusion.  Patient did have heavy prolonged bleeding but now no longer bleeding.  Requests transfusion through infusion center.  Discussed if they can accommodate today then will arrange through [facility] [place].  Patient affirms that she will follow up and manage bleeding issues.

## 2024-01-04 NOTE — PROGRESS NOTES
I spoke to Bridgette at Dr. Adams's office and received okay to add Benadryl 25mg PO as a premedication prior to transfusion. Pt anxious and stated fear of reacting to 1st transfusion. Paper orders for blood transfusion entered into EPIC and copy faxed to pharmacy.

## 2024-01-04 NOTE — TELEPHONE ENCOUNTER
Pt aware to go to the ER. Patient also said she wanted to speak to the Doctor directly. Sent TT to doctor to see if she can reach back out to patient.

## 2024-01-05 LAB
ABO GROUP BLD BPU: NORMAL
ABO GROUP BLD BPU: NORMAL
BPU ID: NORMAL
BPU ID: NORMAL
CROSSMATCH: NORMAL
CROSSMATCH: NORMAL
UNIT DISPENSE STATUS: NORMAL
UNIT DISPENSE STATUS: NORMAL
UNIT PRODUCT CODE: NORMAL
UNIT PRODUCT CODE: NORMAL
UNIT PRODUCT VOLUME: 350 ML
UNIT PRODUCT VOLUME: 350 ML
UNIT RH: NORMAL
UNIT RH: NORMAL

## 2024-01-08 ENCOUNTER — TELEPHONE (OUTPATIENT)
Dept: HEMATOLOGY ONCOLOGY | Facility: CLINIC | Age: 49
End: 2024-01-08

## 2024-01-08 ENCOUNTER — DOCUMENTATION (OUTPATIENT)
Dept: HEMATOLOGY ONCOLOGY | Facility: MEDICAL CENTER | Age: 49
End: 2024-01-08

## 2024-01-08 DIAGNOSIS — D50.0 IRON DEFICIENCY ANEMIA DUE TO CHRONIC BLOOD LOSS: Primary | ICD-10-CM

## 2024-01-08 RX ORDER — SODIUM CHLORIDE 9 MG/ML
20 INJECTION, SOLUTION INTRAVENOUS ONCE
OUTPATIENT
Start: 2024-01-18

## 2024-01-08 NOTE — PROGRESS NOTES
Quick Hematology Note    48-year-old female, good general health, recently with heavy and irregular menstrual periods.  Patient has been seen/evaluated by gynecology; workup is in process.  Recent test results demonstrated severe anemia, patient received 2 units of packed red blood cells.    Presently Mrs. Jarrell states feeling +/-.  Patient still with fatigue.  No other bleeding or bruising issues.  Activities are decreased from the anemia.  No dizziness or syncopal episodes.    Laboratory    1/3/2024 WBC = 6.3 hemoglobin = 6.7 hematocrit = 21.6 MCV = 82 RDW = 14 platelet = 423 neutrophil = 54% ferritin = 7 iron saturation = 4% iron = 19 TIBC = 476    Impression and plan    48-year-old female with otherwise good general health now with iron deficiency anemia due to heavy GYN bleeding.  GYN workup/treatment is in process.    Patient is symptomatic.  CBC parameters and iron studies are all consistent with iron deficiency anemia.  Options were discussed over the phone.  Patient is to be with IV iron.  We discussed the potential benefits versus side effects and toxicities.  Patient understands that the hemoglobin level will not improve if she continues to have significant GYN bleeding.  Patient will be given an office visit follow-up after receiving the iron.    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

## 2024-01-18 ENCOUNTER — HOSPITAL ENCOUNTER (OUTPATIENT)
Dept: INFUSION CENTER | Facility: HOSPITAL | Age: 49
Discharge: HOME | End: 2024-01-18
Attending: INTERNAL MEDICINE

## 2024-01-25 ENCOUNTER — HOSPITAL ENCOUNTER (OUTPATIENT)
Dept: INFUSION CENTER | Facility: HOSPITAL | Age: 49
Discharge: HOME | End: 2024-01-25
Attending: INTERNAL MEDICINE
Payer: COMMERCIAL

## 2024-01-25 VITALS
DIASTOLIC BLOOD PRESSURE: 89 MMHG | SYSTOLIC BLOOD PRESSURE: 143 MMHG | TEMPERATURE: 98 F | HEART RATE: 82 BPM | OXYGEN SATURATION: 98 % | RESPIRATION RATE: 18 BRPM

## 2024-01-25 DIAGNOSIS — D50.0 IRON DEFICIENCY ANEMIA DUE TO CHRONIC BLOOD LOSS: Primary | ICD-10-CM

## 2024-01-25 PROCEDURE — 96365 THER/PROPH/DIAG IV INF INIT: CPT

## 2024-01-25 RX ORDER — SODIUM CHLORIDE 9 MG/ML
20 INJECTION, SOLUTION INTRAVENOUS ONCE
Status: CANCELLED | OUTPATIENT
Start: 2024-02-01

## 2024-01-25 RX ORDER — SODIUM CHLORIDE 9 MG/ML
20 INJECTION, SOLUTION INTRAVENOUS ONCE
Status: COMPLETED | OUTPATIENT
Start: 2024-01-25 | End: 2024-01-25

## 2024-01-25 RX ADMIN — SODIUM CHLORIDE 20 ML/HR: 0.9 INJECTION, SOLUTION INTRAVENOUS at 13:25

## 2024-01-25 RX ADMIN — FERUMOXYTOL 510 MG: 510 INJECTION INTRAVENOUS at 13:26

## 2024-01-25 NOTE — PROGRESS NOTES
..Padma Jarrell  tolerated treatment well with no complications.      Pamda Jarrell is aware of future appt on 2/1/ at 130pm.     AVS printed and given to Padma Jarrell: yes

## 2024-01-25 NOTE — PLAN OF CARE
Problem: Potential for Falls  Goal: Patient will remain free of falls  Description: INTERVENTIONS:  - Educate patient/family on patient safety including physical limitations  - Instruct patient to call for assistance with activity   - Keep Call bell within reach  Outcome: Progressing     Problem: DISCHARGE PLANNING  Goal: Discharge to home or other facility with appropriate resources  Description: INTERVENTIONS:  - Identify barriers to discharge w/patient and caregiver  - Arrange for needed discharge resources and transportation as appropriate  - Identify discharge learning needs (meds, wound care, etc.)  - Arrange for interpretive services to assist at discharge as needed  - Refer to Case Management Department for coordinating discharge planning if the patient needs post-hospital services based on physician/advanced practitioner order or complex needs related to functional status, cognitive ability, or social support system  Outcome: Progressing     Problem: Knowledge Deficit  Goal: Patient/family/caregiver demonstrates understanding of disease process, treatment plan, medications, and discharge instructions  Description: Complete learning assessment and assess knowledge base.  Interventions:  - Provide teaching at level of understanding  - Provide teaching via preferred learning methods  Outcome: Progressing

## 2024-01-31 ENCOUNTER — DOCUMENTATION (OUTPATIENT)
Dept: HEMATOLOGY ONCOLOGY | Facility: MEDICAL CENTER | Age: 49
End: 2024-01-31

## 2024-01-31 ENCOUNTER — TELEMEDICINE (OUTPATIENT)
Dept: HEMATOLOGY ONCOLOGY | Facility: MEDICAL CENTER | Age: 49
End: 2024-01-31
Payer: COMMERCIAL

## 2024-01-31 DIAGNOSIS — D50.0 IRON DEFICIENCY ANEMIA DUE TO CHRONIC BLOOD LOSS: Primary | ICD-10-CM

## 2024-01-31 PROCEDURE — 99213 OFFICE O/P EST LOW 20 MIN: CPT | Performed by: INTERNAL MEDICINE

## 2024-01-31 NOTE — PROGRESS NOTES
Voicemail left for Padma that her follow up with Dr. Sanchez has been scheduled for 3/29/24 at 10am with labs to be done mid March.  I asked her to call back to confirm.

## 2024-01-31 NOTE — PROGRESS NOTES
Virtual Brief Visit    This Visit is being completed by telephone. The Patient is located at Home and in the following state in which I hold an active license [place]    The patient was identified by name and date of birth. Padma Jarrell was informed that this is a telemedicine visit and that the visit is being conducted through Telephone.  My office door was closed. No one else was in the room.  She acknowledged consent and understanding of privacy and security of the video platform. The patient has agreed to participate and understands they can discontinue the visit at any time.    Patient is aware this is a billable service.     Assessment/Plan:    48-year-old female, perimenopausal, recently found to be anemic.  Patient was symptomatic.  Mrs. Jarrell received 2 units of packed red blood cells (hemoglobin level was 6.7 g/deciliter at that time).  Laboratory test results were consistent with iron deficiency anemia.  Previously options were discussed and Mrs. Jarrell has received her first week of Feraheme.  No side effects from the first treatment.  Patient did develop fevers 1 week later and the second week dose was pushed off 1 week.  Workup did not demonstrate any specific infection but patient believes she had a general viral URI.  Patient will receive her second dose tomorrow.    Patient states feeling better, numbness and tingling are less, muscle aches are less and the fatigue is less.  Patient continues to work full-time.    Patient has a pending appointment with gynecology.  If the periods continue to be the cause for the iron deficiency anemia, other options will need to be explored.  Mrs. Jarrell states that she has already had this discussion with Dr. Adams, possibility of hysterectomy in the future.    Plan is to repeat a CBC and iron studies in mid March 2023.  Patient will be seen again in televisit in 2 months.    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  Patient will call the office if she has persistent irregular and heavy periods; interval CBC may be indicated.    Carefully review your medication list and verify that the list is accurate and up-to-date. Please call the hematology/oncology office if there are medications missing from the list, medications on the list that you are not currently taking or if there is a dosage or instruction that is different from how you're taking that medication.    Patient goals and areas of care: Complete Feraheme, follow-up with GYN  Barriers to care: None  Patient is able to self-care    Problem List Items Addressed This Visit    None    Recent Visits  No visits were found meeting these conditions.  Showing recent visits within past 7 days and meeting all other requirements  Future Appointments  No visits were found meeting these conditions.  Showing future appointments within next 150 days and meeting all other requirements     HPI: 48-year-old female with a recent history of irregular and heavy menstrual periods.  In the early fall 2023 patient began to have hot flashes, intermenstrual bleeding.  Additional workup demonstrated anemia with iron deficiency.  Patient has received 1 dose of Feraheme.    Presently Mrs. Jarrell states feeling okay, little bit better than before.  Patient's initial symptoms associated with the anemia were significant fatigue, muscle aches, tingling, decreased activities, occasional dizziness and occasional restless legs.  No chewing ice.  No GI or  issues.  Routine health maintenance and medical care has been otherwise up-to-date.    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

## 2024-02-01 ENCOUNTER — HOSPITAL ENCOUNTER (OUTPATIENT)
Dept: INFUSION CENTER | Facility: HOSPITAL | Age: 49
Discharge: HOME | End: 2024-02-01
Attending: INTERNAL MEDICINE
Payer: COMMERCIAL

## 2024-02-01 VITALS
OXYGEN SATURATION: 98 % | HEART RATE: 80 BPM | DIASTOLIC BLOOD PRESSURE: 69 MMHG | TEMPERATURE: 97.9 F | RESPIRATION RATE: 18 BRPM | SYSTOLIC BLOOD PRESSURE: 143 MMHG

## 2024-02-01 DIAGNOSIS — D50.0 IRON DEFICIENCY ANEMIA DUE TO CHRONIC BLOOD LOSS: Primary | ICD-10-CM

## 2024-02-01 PROCEDURE — 96365 THER/PROPH/DIAG IV INF INIT: CPT

## 2024-02-01 RX ORDER — SODIUM CHLORIDE 9 MG/ML
20 INJECTION, SOLUTION INTRAVENOUS ONCE
Status: CANCELLED | OUTPATIENT
Start: 2024-02-01

## 2024-02-01 RX ORDER — SODIUM CHLORIDE 9 MG/ML
20 INJECTION, SOLUTION INTRAVENOUS ONCE
Status: COMPLETED | OUTPATIENT
Start: 2024-02-01 | End: 2024-02-01

## 2024-02-01 RX ADMIN — FERUMOXYTOL 510 MG: 510 INJECTION INTRAVENOUS at 13:43

## 2024-02-01 RX ADMIN — SODIUM CHLORIDE 20 ML/HR: 0.9 INJECTION, SOLUTION INTRAVENOUS at 13:43

## 2024-02-01 NOTE — PROGRESS NOTES
Patient tolerated treatment well with no adverse effects. Offers no complaints. AVS declined.

## 2024-02-09 ENCOUNTER — TELEPHONE (OUTPATIENT)
Dept: OBGYN CLINIC | Facility: CLINIC | Age: 49
End: 2024-02-09

## 2024-02-09 DIAGNOSIS — N92.1 MENORRHAGIA WITH IRREGULAR CYCLE: ICD-10-CM

## 2024-02-09 DIAGNOSIS — N94.3 PMS (PREMENSTRUAL SYNDROME): ICD-10-CM

## 2024-02-09 RX ORDER — ALPRAZOLAM 0.25 MG/1
0.25 TABLET ORAL 2 TIMES DAILY PRN
Qty: 15 TABLET | Refills: 0 | Status: SHIPPED | OUTPATIENT
Start: 2024-02-09

## 2024-02-09 RX ORDER — TRANEXAMIC ACID 650 MG/1
1300 TABLET ORAL 3 TIMES DAILY PRN
Qty: 30 TABLET | Refills: 1 | Status: SHIPPED | OUTPATIENT
Start: 2024-02-09

## 2024-02-09 NOTE — TELEPHONE ENCOUNTER
Pt called and states her period started 2/2/2024 and would like to know if you could refill the TXA and Xanax for her.  States her period is heavy and would like to have medication to help slow it down.  She is scheduled for a follow up at the end of this month.

## 2024-02-23 ENCOUNTER — OFFICE VISIT (OUTPATIENT)
Dept: OBGYN CLINIC | Facility: CLINIC | Age: 49
End: 2024-02-23
Payer: COMMERCIAL

## 2024-02-23 VITALS
SYSTOLIC BLOOD PRESSURE: 124 MMHG | BODY MASS INDEX: 32.2 KG/M2 | HEIGHT: 62 IN | DIASTOLIC BLOOD PRESSURE: 90 MMHG | WEIGHT: 175 LBS

## 2024-02-23 DIAGNOSIS — N92.1 MENOMETRORRHAGIA: Primary | ICD-10-CM

## 2024-02-23 PROCEDURE — 99213 OFFICE O/P EST LOW 20 MIN: CPT | Performed by: OBSTETRICS & GYNECOLOGY

## 2024-02-23 NOTE — PROGRESS NOTES
Subjective     Padma Jarrell is a 48 y.o.  female here for a problem visit.  Patient had called urgently due to extreme symptomatic anemia.  End of September patient had a 2-week cycle followed by a cycle  day 3 was heavy with clots and this cycle also lasted about 2 weeks went heavy then light and had continued bleeding until  followed by 3 weeks of heavy cycles with gushing flow  patient was prescribed TXA and encouraged to be evaluated she has taken the TXA very sparingly she used it also on the  and  and January third she received 2 units of blood on the fourth and then started iron infusions with hematology in February.  She had a menses  that seemed somewhat more normal lasting 10 days 3 days heavy with clots.  She then had 1 week of no bleeding followed by another menstrual cycle.  Discussed and reviewed again options to treat including but not limited to progestin only pill, Nexplanon, progesterone IUD, OCP, increased use of TXA, ablation, hysterectomy.  Patient reports no further questions at this time she is willing to use TXA a little more regularly with her heavy flow we discussed that she will not be able to accomplish rebuilding her blood count if she continues to allow bleeding.  Patient will get follow-up labs as well as ultrasound and mammogram.  Patient will consider her options    Gynecologic History  Patient's last menstrual period was 2024 (exact date).  Contraception: tubal ligation  Last Pap: 2021. Results were: normal  Last mammogram: . Results were: Normal dense    Obstetric History  OB History    Para Term  AB Living   4 4 2     2   SAB IAB Ectopic Multiple Live Births           2      # Outcome Date GA Lbr Jey/2nd Weight Sex Delivery Anes PTL Lv   4 Para 2007    F CS-Unspec      3 Para 2004    M CS-Unspec      2 Term            1 Term               Obstetric Comments   Menarche-Age 13         The  "following portions of the patient's history were reviewed and updated as appropriate: allergies, current medications, past family history, past medical history, past social history, past surgical history, and problem list.    Review of Systems  Review of Systems   Constitutional: Negative.  Negative for chills and fever.   HENT: Negative.  Negative for ear pain and sore throat.    Eyes: Negative.  Negative for pain and visual disturbance.   Respiratory: Negative.  Negative for cough and shortness of breath.    Cardiovascular: Negative.  Negative for chest pain and palpitations.   Gastrointestinal: Negative.  Negative for abdominal pain and vomiting.   Genitourinary:  Positive for vaginal bleeding. Negative for dysuria and hematuria.   Musculoskeletal: Negative.  Negative for arthralgias and back pain.   Skin: Negative.  Negative for color change and rash.   Neurological:  Positive for headaches. Negative for seizures and syncope.        Migranes   All other systems reviewed and are negative.       Objective     /90 (BP Location: Left arm, Patient Position: Sitting, Cuff Size: Standard)   Ht 5' 2\" (1.575 m)   Wt 79.4 kg (175 lb) Comment: per pt  LMP 02/21/2024 (Exact Date)   BMI 32.01 kg/m²   General appearance: alert and oriented, in no acute distress  Head: Normocephalic, without obvious abnormality, atraumatic  Extremities: extremities normal, warm and well-perfused; no cyanosis, clubbing, or edema      Assessment  48-year-old G2, P2 with menometrorrhagia and significant anemia slightly improved on TXA and iron infusions.     Plan  Has labs to perform CBC and iron levels, considering options TXA renewed, slip for pelvic ultrasound encourage patient that bleeding must be decreased or controlled in order for her to continue to recover and that options such as IUD or Nexplanon may be beneficial to assist in this process or even OCP.  Patient will consider options and contact us and follow-up based on " results.  Scheduled for next mammogram already

## 2024-03-07 ENCOUNTER — HOSPITAL ENCOUNTER (OUTPATIENT)
Dept: RADIOLOGY | Facility: HOSPITAL | Age: 49
Discharge: HOME | End: 2024-03-07
Attending: OBSTETRICS & GYNECOLOGY
Payer: COMMERCIAL

## 2024-03-07 DIAGNOSIS — N92.1 MENOMETRORRHAGIA: ICD-10-CM

## 2024-03-07 PROCEDURE — 76856 US EXAM PELVIC COMPLETE: CPT

## 2024-03-07 PROCEDURE — 76830 TRANSVAGINAL US NON-OB: CPT

## 2024-03-14 ENCOUNTER — TELEPHONE (OUTPATIENT)
Age: 49
End: 2024-03-14

## 2024-03-14 NOTE — TELEPHONE ENCOUNTER
Pelvic US suggestive of endometrial polyp in uterus. These tend to be benign but likely explain the irregular bleeding she's been having so should have removed.    Please call to review and recommend preop visit for hysteroscopy, D&C, polypectomy    Thanks!

## 2024-03-14 NOTE — TELEPHONE ENCOUNTER
Someone from radiology call with significant findings on Patient's Ultrasound , I could not reach the office and the person from radiology 's call was dropped  please see ultra sound results   Narrative & Impression

## 2024-03-15 ENCOUNTER — TELEPHONE (OUTPATIENT)
Age: 49
End: 2024-03-15

## 2024-03-15 NOTE — TELEPHONE ENCOUNTER
Patient was calling because she wanted to double check to make sure information about her upcoming appointment.  I informed her the appointment was in fact for a pre-op with Dr. Adams.  She was put at ease knowing that.  She did state she was wondering if it would be a full hysterectomy? I stated at time of appointment she could discuss further with Dr. Adams. Patient did state she is flexible, and if she can have the appointment sooner/procedure sooner-the-better.

## 2024-03-22 LAB
BASOPHILS # BLD AUTO: 0 X10E3/UL (ref 0–0.2)
BASOPHILS NFR BLD AUTO: 1 %
EOSINOPHIL # BLD AUTO: 0.1 X10E3/UL (ref 0–0.4)
EOSINOPHIL NFR BLD AUTO: 2 %
ERYTHROCYTE [DISTWIDTH] IN BLOOD BY AUTOMATED COUNT: 17.3 % (ref 11.7–15.4)
FERRITIN SERPL-MCNC: 108 NG/ML (ref 15–150)
HCT VFR BLD AUTO: 41.1 % (ref 34–46.6)
HGB BLD-MCNC: 13.1 G/DL (ref 11.1–15.9)
IMM GRANULOCYTES # BLD: 0 X10E3/UL (ref 0–0.1)
IMM GRANULOCYTES NFR BLD: 0 %
IRON SATN MFR SERPL: 20 % (ref 15–55)
IRON SERPL-MCNC: 70 UG/DL (ref 27–159)
LYMPHOCYTES # BLD AUTO: 2.1 X10E3/UL (ref 0.7–3.1)
LYMPHOCYTES NFR BLD AUTO: 31 %
MCH RBC QN AUTO: 26.9 PG (ref 26.6–33)
MCHC RBC AUTO-ENTMCNC: 31.9 G/DL (ref 31.5–35.7)
MCV RBC AUTO: 84 FL (ref 79–97)
MONOCYTES # BLD AUTO: 0.6 X10E3/UL (ref 0.1–0.9)
MONOCYTES NFR BLD AUTO: 9 %
NEUTROPHILS # BLD AUTO: 3.9 X10E3/UL (ref 1.4–7)
NEUTROPHILS NFR BLD AUTO: 57 %
PLATELET # BLD AUTO: 297 X10E3/UL (ref 150–450)
RBC # BLD AUTO: 4.87 X10E6/UL (ref 3.77–5.28)
TIBC SERPL-MCNC: 357 UG/DL (ref 250–450)
UIBC SERPL-MCNC: 287 UG/DL (ref 131–425)
WBC # BLD AUTO: 6.8 X10E3/UL (ref 3.4–10.8)

## 2024-04-26 ENCOUNTER — CONSULT (OUTPATIENT)
Dept: OBGYN CLINIC | Facility: CLINIC | Age: 49
End: 2024-04-26
Payer: COMMERCIAL

## 2024-04-26 VITALS
SYSTOLIC BLOOD PRESSURE: 134 MMHG | DIASTOLIC BLOOD PRESSURE: 88 MMHG | HEIGHT: 62 IN | WEIGHT: 172 LBS | BODY MASS INDEX: 31.65 KG/M2

## 2024-04-26 DIAGNOSIS — N92.1 MENORRHAGIA WITH IRREGULAR CYCLE: Primary | ICD-10-CM

## 2024-04-26 PROCEDURE — 99213 OFFICE O/P EST LOW 20 MIN: CPT | Performed by: OBSTETRICS & GYNECOLOGY

## 2024-04-26 NOTE — PROGRESS NOTES
Subjective     Padma Jarrell is a 48 y.o.  female here for a problem visit.  Patient's hemoglobin is back to normal as well as her iron levels after being treated with Dr. Sanchez with [facility].  Patient however continues to have heavy menses recent ultrasound shows polyp.  Patient has decided that she would like to have D&C hysteroscopy with removal of polyp first.  Patient does not want ablation.  Patient aware that cycles could become heavy again and would plan a progesterone IUD if needed to control symptoms.  Patient fully counseled to D&C hysteroscopy removal of polyp procedure as well as risks and benefits expected outcomes and surgical instructions.  Patient reports no further questions and desires to proceed.  Patient has 2-week [place] vacation in  will use TXA as needed to control cycles until able to have procedure she is willing to have it in July.  Prefers [place] [facility] if available.     Gynecologic History  No LMP recorded. Patient is premenopausal.  Contraception: tubal ligation  Last Pap: 2023. Results were: normal (has history of high risk HPV)  Last mammogram: 2023. Results were: Normal, dense already scheduled for next also counseled and has order for ABUS if desired    Obstetric History  OB History    Para Term  AB Living   2 2 0     2   SAB IAB Ectopic Multiple Live Births           2      # Outcome Date GA Lbr Jey/2nd Weight Sex Delivery Anes PTL Lv   2 Para     F CS-Unspec      1 Para     M CS-Unspec         Obstetric Comments   Menarche-Age 13     Past Medical History:   Diagnosis Date    Breast disorder     Depression     Disease of thyroid gland     Hyperlipidemia     Other abnormal findings in urine 10/25/2006    Pancreatic pseudocyst 2011    Peptic ulcer     last assessed 16    Viral gastroenteritis 2010     Past Surgical History:   Procedure Laterality Date     SECTION      CHOLECYSTECTOMY  2019    TUBAL  "LIGATION       Current Outpatient Medications   Medication Instructions    ALPRAZolam (XANAX) 0.25 mg, Oral, 2 times daily PRN    metoprolol tartrate (LOPRESSOR) 25 mg, Oral, Daily    MISC NATURAL PRODUCTS ER PO Oral    Multiple Vitamin (MULTI-VITAMIN DAILY PO) Oral    Multiple Vitamins-Minerals (DAILY MULTI VITAMIN/MINERALS PO) Oral, Daily    Omega 3-6-9 Fatty Acids (OMEGA 3-6-9 COMPLEX PO) 1 capsule, Oral, Daily    rosuvastatin (CRESTOR) 5 mg, Oral, Daily    Tranexamic Acid 1,300 mg, Oral, 3 times daily PRN    Ubrogepant (UBRELVY) 50 mg, As needed     Allergies   Allergen Reactions    Penicillins      Social History     Tobacco Use    Smoking status: Never    Smokeless tobacco: Never   Vaping Use    Vaping status: Never Used   Substance Use Topics    Alcohol use: No    Drug use: No       Review of Systems  Review of Systems   Constitutional: Negative.  Negative for chills, fatigue, fever and unexpected weight change.   HENT:  Positive for sinus pressure and sinus pain. Negative for dental problem.    Eyes: Negative.  Negative for visual disturbance.   Respiratory: Negative.  Negative for cough, shortness of breath and wheezing.    Cardiovascular: Negative.  Negative for chest pain and leg swelling.   Gastrointestinal: Negative.  Negative for constipation, diarrhea, nausea and vomiting.   Genitourinary:  Positive for menstrual problem. Negative for pelvic pain and urgency.        Heavy bleeding   Musculoskeletal: Negative.  Negative for back pain.   Allergic/Immunologic: Positive for environmental allergies.   Neurological:  Positive for headaches. Negative for dizziness.        Migraines        Objective     /88 (BP Location: Left arm, Patient Position: Sitting, Cuff Size: Standard)   Ht 5' 2\" (1.575 m)   Wt 78 kg (172 lb)   LMP 04/24/2024   BMI 31.46 kg/m²   General appearance: alert and oriented, in no acute distress  Head: Normocephalic, without obvious abnormality, atraumatic  Lungs: clear to " auscultation bilaterally  Heart: regular rate and rhythm, S1, S2 normal, no murmur, click, rub or gallop  Abdomen: soft, non-tender; bowel sounds normal; no masses,  no organomegaly  Extremities: extremities normal, warm and well-perfused; no cyanosis, clubbing, or edema      Assessment  48-year-old G2, P2 with heavy menses causing anemia as well as endometrial polyp.     Plan  D&C hysteroscopy with removal of polyp and exam under anesthesia.  If patient continues to have heavy menses she is willing to have IUD to control the cycles

## 2024-05-02 ENCOUNTER — TELEPHONE (OUTPATIENT)
Dept: OBGYN CLINIC | Facility: CLINIC | Age: 49
End: 2024-05-02

## 2024-05-02 ENCOUNTER — TELEPHONE (OUTPATIENT)
Dept: GYNECOLOGY | Facility: CLINIC | Age: 49
End: 2024-05-02

## 2024-05-02 NOTE — TELEPHONE ENCOUNTER
----- Message from Shelby Adams MD sent at 2024  4:00 PM EDT -----  Regarding: D&C hysteroscopy removal of polyp  [facility] GYN Department  Surgery Scheduling Sheet    Patient Name: Padma Jarrell  : 1975    Provider: Shelby Adams MD     Needed: no; Language: N/A    Procedure: exam under anesthesia, dilation and curettage , hysteroscopy, and resection of uterine pathology    Diagnosis: menorrhaghia, endometrial polyp    Special Needs or Equipment: none and symphion    Anesthesia: IV sedation with anesthesia    Length of stay: outpatient  Does patient have comorbid conditions that will require close perioperative monitoring prior to safe discharge: no    The patient has comorbid conditions that will require close perioperative monitoring prior to safe discharge, including N/A.   This may require acute care beyond the usual and routine recovery period. As such, inpatient admission post-operatively is expected and appropriate, and anticipated hospital length of stay will be >2 midnights.    Pre-Admission Testing Needed: yes   Labs that should be ordered: cbc and cmp    Order PAT that is recommended in prep for procedure?: Yes    Medical Clearance Needed: no; Provider: N/A    MA Form Signed (tubals/hysterectomy): Not Indicated    Surgical Drink Given: no     How many days out of work: 5 day(s)     How many days no drivin day(s)       Is pre op appt needed?  No,done  Interval for post op appt: 2 week(s)     For Surgical Scheduler:     Surgery Scheduled On:  [place]: [facility] patient prefer [place] if aviailable,  has [place] vacation in  so prefer July    Pre-op Appt:   Post op Appt:  Consult/Medical clearance appt:

## 2024-06-11 ENCOUNTER — HOSPITAL ENCOUNTER (OUTPATIENT)
Dept: MAMMOGRAPHY | Facility: HOSPITAL | Age: 49
Discharge: HOME | End: 2024-06-11
Attending: OBSTETRICS & GYNECOLOGY
Payer: COMMERCIAL

## 2024-06-11 VITALS — BODY MASS INDEX: 31.64 KG/M2 | WEIGHT: 171.96 LBS | HEIGHT: 62 IN

## 2024-06-11 DIAGNOSIS — Z12.31 ENCOUNTER FOR SCREENING MAMMOGRAM FOR BREAST CANCER: ICD-10-CM

## 2024-06-11 PROCEDURE — 77067 SCR MAMMO BI INCL CAD: CPT

## 2024-06-11 PROCEDURE — 77063 BREAST TOMOSYNTHESIS BI: CPT

## 2024-09-04 ENCOUNTER — OFFICE VISIT (OUTPATIENT)
Dept: OBGYN CLINIC | Facility: CLINIC | Age: 49
End: 2024-09-04
Payer: COMMERCIAL

## 2024-09-04 ENCOUNTER — APPOINTMENT (OUTPATIENT)
Dept: RADIOLOGY | Facility: CLINIC | Age: 49
End: 2024-09-04
Payer: COMMERCIAL

## 2024-09-04 VITALS
BODY MASS INDEX: 31.47 KG/M2 | HEIGHT: 62 IN | HEART RATE: 76 BPM | DIASTOLIC BLOOD PRESSURE: 101 MMHG | WEIGHT: 171 LBS | SYSTOLIC BLOOD PRESSURE: 155 MMHG

## 2024-09-04 DIAGNOSIS — M25.461 EFFUSION OF RIGHT KNEE: ICD-10-CM

## 2024-09-04 DIAGNOSIS — S83.8X1A SPRAIN OF OTHER LIGAMENT OF RIGHT KNEE, INITIAL ENCOUNTER: Primary | ICD-10-CM

## 2024-09-04 DIAGNOSIS — Z01.89 ENCOUNTER FOR LOWER EXTREMITY COMPARISON IMAGING STUDY: ICD-10-CM

## 2024-09-04 DIAGNOSIS — M25.561 ACUTE PAIN OF RIGHT KNEE: ICD-10-CM

## 2024-09-04 DIAGNOSIS — M25.561 RIGHT KNEE PAIN, UNSPECIFIED CHRONICITY: ICD-10-CM

## 2024-09-04 PROCEDURE — 73562 X-RAY EXAM OF KNEE 3: CPT

## 2024-09-04 PROCEDURE — 99203 OFFICE O/P NEW LOW 30 MIN: CPT | Performed by: ORTHOPAEDIC SURGERY

## 2024-09-04 PROCEDURE — 73564 X-RAY EXAM KNEE 4 OR MORE: CPT

## 2024-09-04 RX ORDER — MELOXICAM 7.5 MG/1
7.5 TABLET ORAL DAILY
Qty: 30 TABLET | Refills: 1 | Status: SHIPPED | OUTPATIENT
Start: 2024-09-04 | End: 2024-11-03

## 2024-09-04 NOTE — PROGRESS NOTES
Assessment/Plan:  1. Sprain of other ligament of right knee, initial encounter  Ambulatory Referral to Physical Therapy      2. Effusion of right knee  meloxicam (Mobic) 7.5 mg tablet      3. Acute pain of right knee  XR knee 4+ vw right injury    Ambulatory Referral to Physical Therapy    meloxicam (Mobic) 7.5 mg tablet      4. Encounter for lower extremity comparison imaging study  XR knee 3 vw left non injury        Scribe Attestation    I,:  Carolin Luna am acting as a scribe while in the presence of the attending physician.:       I,:  Zay Pinon MD personally performed the services described in this documentation    as scribed in my presence.:             Padma is a pleasant 48 y.o. adult who presents for initial evaluation of the right knee. I believe she may be symptomatic of a capsular strain or possible meniscal injury of the right knee. I recommend taking oral anti-inflammatories or topical anti-inflammatories for 2 to 3 weeks. If her pain persists past that point we would consider ordering an MRI of the right knee. I offered her a prescription for meloxicam 7.5 mg to help with her inflammation. She elected to proceed with physical therapy and anti-inflammatories for 2 weeks. She may continue to wear a compression sleeve as needed. She will follow-up after 4 weeks for re-evaluation of the right knee.    Subjective:   Padma Jarrell is a 48 y.o. adult who presents for initial evaluation of the right knee. She recalls a twisting injury to the right knee when cooking in the kitchen. She does not recall hearing a pop in the right knee. She states the pain was manageable. She recalls the knee swelling a few days later. She had been resting, elevating, and applying ice to the right knee which helped some. She was feeling better after a week and knelt down while cleaning. She experienced lateral sided knee pain followed by swelling at that time. She reports experiencing stiffness when moving the right  knee.  She reports the knee is still swollen and cracks frequently. She denies any feelings of catching or locking of the right knee. She feels as though the right leg is weakened.She was prescribed flexeril and baby Asprin for 2 weeks, which helped. She has also taken Advil as needed.      Review of Systems   Constitutional:  Positive for activity change. Negative for chills and fever.   HENT:  Negative for ear pain and sore throat.    Eyes:  Negative for pain and visual disturbance.   Respiratory:  Negative for cough and shortness of breath.    Cardiovascular:  Negative for chest pain and palpitations.   Gastrointestinal:  Negative for abdominal pain and vomiting.   Genitourinary:  Negative for dysuria and hematuria.   Musculoskeletal:  Positive for arthralgias and joint swelling. Negative for back pain.   Skin:  Negative for color change and rash.   Neurological:  Negative for seizures and syncope.   All other systems reviewed and are negative.        Past Medical History:   Diagnosis Date   • Breast disorder    • Depression    • Disease of thyroid gland    • Hyperlipidemia    • Other abnormal findings in urine 10/25/2006   • Pancreatic pseudocyst 2011   • Peptic ulcer     last assessed 16   • Viral gastroenteritis 2010       Past Surgical History:   Procedure Laterality Date   •  SECTION     • CHOLECYSTECTOMY  2019   • TUBAL LIGATION         Family History   Problem Relation Age of Onset   • No Known Problems Mother    • Thyroid disease Father    • Prostate cancer Father 70   • No Known Problems Daughter    • No Known Problems Maternal Grandmother    • No Known Problems Maternal Grandfather    • No Known Problems Paternal Grandmother    • No Known Problems Paternal Grandfather    • No Known Problems Son    • No Known Problems Paternal Aunt    • No Known Problems Paternal Aunt        Social History     Occupational History   • Occupation: RN   Tobacco Use   • Smoking status: Never    • Smokeless tobacco: Never   Vaping Use   • Vaping status: Never Used   Substance and Sexual Activity   • Alcohol use: No   • Drug use: No   • Sexual activity: Yes     Partners: Male     Birth control/protection: Female Sterilization     Comment: pt declines std hiv testing         Current Outpatient Medications:   •  ALPRAZolam (XANAX) 0.25 mg tablet, Take 1 tablet (0.25 mg total) by mouth 2 (two) times a day as needed for anxiety, Disp: 15 tablet, Rfl: 0  •  meloxicam (Mobic) 7.5 mg tablet, Take 1 tablet (7.5 mg total) by mouth daily, Disp: 30 tablet, Rfl: 1  •  metoprolol tartrate (LOPRESSOR) 25 mg tablet, Take 25 mg by mouth daily., Disp: , Rfl:   •  MISC NATURAL PRODUCTS ER PO, Take by mouth, Disp: , Rfl:   •  Multiple Vitamin (MULTI-VITAMIN DAILY PO), Take by mouth, Disp: , Rfl:   •  Omega 3-6-9 Fatty Acids (OMEGA 3-6-9 COMPLEX PO), Take 1 capsule by mouth in the morning, Disp: , Rfl:   •  rosuvastatin (CRESTOR) 5 mg tablet, Take 5 mg by mouth daily, Disp: , Rfl:   •  Tranexamic Acid 650 MG TABS, Take 2 tablets (1,300 mg total) by mouth 3 (three) times a day as needed (heavy menses), Disp: 30 tablet, Rfl: 1  •  Ubrogepant (UBRELVY) 50 MG tablet, Take 50 mg by mouth if needed, Disp: , Rfl:   •  Multiple Vitamins-Minerals (DAILY MULTI VITAMIN/MINERALS PO), Take by mouth daily (Patient not taking: Reported on 9/4/2024), Disp: , Rfl:     Allergies   Allergen Reactions   • Penicillins        Objective:  Vitals:    09/04/24 0938   BP: (!) 155/101   Pulse: 76       Right Knee Exam     Tenderness   The patient is experiencing tenderness in the medial joint line.    Range of Motion   Extension:  0   Flexion:  130     Tests   Jolanta:  Medial - negative Lateral - negative  Varus: negative Valgus: negative  Lachman:  Anterior - negative        Other   Erythema: absent  Scars: absent  Sensation: normal  Pulse: present  Swelling: mild  Effusion: effusion (1+) present    Comments:  Popliteal fullness  Mildly positive  Dwain's          Observations     Right Knee   Positive for effusion (1+).       Physical Exam  Vitals and nursing note reviewed.   Constitutional:       Appearance: Normal appearance.   HENT:      Head: Normocephalic and atraumatic.      Right Ear: External ear normal.      Left Ear: External ear normal.      Nose: Nose normal.   Eyes:      General: No scleral icterus.     Extraocular Movements: Extraocular movements intact.      Conjunctiva/sclera: Conjunctivae normal.   Cardiovascular:      Rate and Rhythm: Normal rate.   Pulmonary:      Effort: Pulmonary effort is normal. No respiratory distress.   Musculoskeletal:      Cervical back: Normal range of motion and neck supple.      Right knee: Effusion (1+) present.      Instability Tests: Medial Jolanta test negative and lateral Jolanta test negative.      Comments: See ortho exam   Skin:     General: Skin is warm and dry.   Neurological:      Mental Status: She is alert and oriented to person, place, and time.   Psychiatric:         Mood and Affect: Mood normal.         Behavior: Behavior normal.         I have personally reviewed pertinent films in PACS and my interpretation is as follows:  X-rays of the right knee obtained in the office today demonstrate now significant degenerative changes. No acute fractures or dislocations present.       This document was created using speech voice recognition software.   Grammatical errors, random word insertions, pronoun errors, and incomplete sentences are an occasional consequence of this system due to software limitations, ambient noise, and hardware issues.   Any formal questions or concerns about content, text, or information contained within the body of this dictation should be directly addressed to the provider for clarification.

## 2024-09-09 ENCOUNTER — EVALUATION (OUTPATIENT)
Dept: PHYSICAL THERAPY | Facility: CLINIC | Age: 49
End: 2024-09-09
Payer: COMMERCIAL

## 2024-09-09 DIAGNOSIS — S83.8X1A SPRAIN OF OTHER LIGAMENT OF RIGHT KNEE, INITIAL ENCOUNTER: ICD-10-CM

## 2024-09-09 DIAGNOSIS — M25.561 ACUTE PAIN OF RIGHT KNEE: ICD-10-CM

## 2024-09-09 PROCEDURE — 97162 PT EVAL MOD COMPLEX 30 MIN: CPT | Performed by: PHYSICAL THERAPIST

## 2024-09-09 NOTE — PROGRESS NOTES
PT Evaluation     Today's date: 2024  Patient name: Padma Jarrell  : 1975  MRN: [id number]  Referring provider: Zay Pinon MD  Dx:   Encounter Diagnosis     ICD-10-CM    1. Sprain of other ligament of right knee, initial encounter  S83.8X1A Ambulatory Referral to Physical Therapy      2. Acute pain of right knee  M25.561 Ambulatory Referral to Physical Therapy                     Assessment  Impairments: abnormal gait, abnormal muscle firing, abnormal muscle tone, abnormal or restricted ROM, activity intolerance, impaired balance, impaired physical strength, lacks appropriate home exercise program, pain with function, weight-bearing intolerance, poor body mechanics, activity limitations and endurance    Assessment details: Padma Jarrell is a 48 y.o. adult who presents with: Sprain of other ligament of right knee, initial encounter  Acute pain of right knee. Pt presents with pain, decreased LE strength, impaired function, decreased activity tolerance, and fair balance. Pt presents with these impairments and would benefit from skilled physical therapy to address these impairments in order to maximize their function. Pt's signs and symptoms consistent with R knee medial ligamentous sprain and appropriate for skilled Pt to LE strengthening/stretching.  Understanding of Dx/Px/POC: good     Prognosis: good    Goals  Short term goals:  (to be met in 4 weeks)  + Patient will have pain level less than 2/10 right knee with transfers and stairs  + Patient will report a 50% improvement in symptoms  + Patient will be independent in basic HEP   + Patient will demonstrate appropriate hip, knee, and ankle alignment with functional activities on even/uneven terrain      Long term goals:(to be met in 8 weeks)  +  Patient will be independent in a comprehensive home exercise program   +  Patient will have no gait deviations ambulating on level surfaces pain fee      +  Patient will report 85-95 % improvement  "in symptoms   +  Patient will negotiate stairs up and down pain free with use of one railing.  +  Patient will have pain level 0/10, right knee with all transfers, stairs, and gait  + Patient will increase FOTO score by 10 points  + Patient will return to walking 30 min 3x/day as prior to injury                         Plan  Patient would benefit from: skilled physical therapy  Planned modality interventions: cryotherapy and thermotherapy: hydrocollator packs    Planned therapy interventions: abdominal trunk stabilization, activity modification, ADL retraining, ADL training, balance, body mechanics training, breathing training, flexibility, functional ROM exercises, gait training, graded activity, graded exercise, graded motor, home exercise program, transfer training, therapeutic training, therapeutic exercise, therapeutic activities, stretching, strengthening, postural training, patient/caregiver education, neuromuscular re-education and manual therapy    Frequency: 1x week  Duration in weeks: 8  Plan of Care beginning date: 9/9/2024  Plan of Care expiration date: 11/8/2024  Treatment plan discussed with: patient      Subjective Evaluation    History of Present Illness  Mechanism of injury: Pt is here for PT eval for R knee pain. As per ortho note \"She recalls a twisting injury to the right knee when cooking in the kitchen. She does not recall hearing a pop in the right knee. She states the pain was manageable. She recalls the knee swelling a few days later. She had been resting, elevating, and applying ice to the right knee which helped some. She was feeling better after a week and knelt down while cleaning. She experienced lateral sided knee pain followed by swelling at that time. She reports experiencing stiffness when moving the right knee.  She reports the knee is still swollen and cracks frequently. She denies any feelings of catching or locking of the right knee. She feels as though the right leg is " "weakened.She was prescribed flexeril and baby Asprin for 2 weeks, which helped. She has also taken Advil as needed.\"     About a month a ago \"I twisted my knee and I felt pain on inside of me knee and I used ice and rested\". Pt was on flexeril x 2 weeks which helped approx 80%. \"Now I can bend my knee\"     Possible MRI as per ortho note if pain persists R knee.       Pt states pain with stairs with decending more than ascending.  Pt works as nurse (more administrative)            Not a recurrent problem   Quality of life: good    Patient Goals  Patient goals for therapy: decreased edema, improved balance, increased motion, increased strength, return to sport/leisure activities and independence with ADLs/IADLs  Patient goal: \" I want to not think about my leg and get back to full movement\"  Pain  Current pain ratin  At best pain ratin  At worst pain ratin  Location: R medial aspect of knee  Quality: throbbing, tight, sharp, pulling and radiating  Relieving factors: change in position, ice, relaxation, rest, support and medications  Aggravating factors: stair climbing  Progression: improved    Social Support  Steps to enter house: yes (5 steps)  Stairs in house: yes   Lives in: multiple-level home  Lives with: spouse    Employment status: working (nursing admin, computer work)  Hand dominance: right  Exercise history: walking 3x/week 30 min,    Treatments  Previous treatment: medication      Objective     Palpation     Right   Tenderness of the distal biceps femoris, distal semimembranosus, distal semitendinosus, lateral gastrocnemius and medial gastrocnemius.     Neurological Testing     Sensation     Knee   Left Knee   Intact: Light touch    Right Knee   Intact: light touch     Active Range of Motion   Left Knee   Normal active range of motion  Flexion: 130 degrees   Extension: 0 degrees     Right Knee   Normal active range of motion  Flexion: 130 degrees   Extension: 0 degrees     Strength/Myotome " Testing     Left Knee   Flexion: 4-  Extension: 4-    Right Knee   Flexion: 3  Extension: 3  Quadriceps contraction: good    General Comments:      Knee Comments  SLS R and L 10 sec, 5 sec EC R LE and 8 sec L EC  R LE hamstring/gastroc tightness noted.                         Insurance:  AMA/CMS Eval/ Re-eval POC expires FOTO Auth #/ Referral # Total units  Start date  Expiration date Extension  Visit limitation?  PT only or  PT+OT? Co-Insurance   [employer] 9/9        30 visits/jose year  $0                                                                  Date 9/9              Units:  Used 1              Authed:  Remaining  29                   Date               Units:  Used               Authed:  Remaining                      Date 9/9        Visit Number IE        Manual         PROM R knee RL        R knee patellar mobs RL                                    TherEx         Nu step         bridge         Heel slides on ball         Hamstring stretch with SOS supine X5 hold 10 sec                                            Neuro Re-Ed         Glut sets with hip add iso         SLR flexion X5 hold 3        LAQ X5 with ankle DF        Quad sets X5 hold 5        TRX squats         SAQ                           TherAct         Patient education HEP issued and reviewed                                            Gait Training                                    Modalities                      Precautions: acute R knee pain  Past Medical History:   Diagnosis Date    Breast disorder     Depression     Disease of thyroid gland     Hyperlipidemia     Other abnormal findings in urine 10/25/2006    Pancreatic pseudocyst 06/29/2011    Peptic ulcer     last assessed 01/27/16    Viral gastroenteritis 12/22/2010

## 2024-09-13 ENCOUNTER — OFFICE VISIT (OUTPATIENT)
Dept: PHYSICAL THERAPY | Facility: CLINIC | Age: 49
End: 2024-09-13
Payer: COMMERCIAL

## 2024-09-13 DIAGNOSIS — M25.561 ACUTE PAIN OF RIGHT KNEE: ICD-10-CM

## 2024-09-13 DIAGNOSIS — S83.8X1A SPRAIN OF OTHER LIGAMENT OF RIGHT KNEE, INITIAL ENCOUNTER: Primary | ICD-10-CM

## 2024-09-13 PROCEDURE — 97112 NEUROMUSCULAR REEDUCATION: CPT | Performed by: PHYSICAL THERAPIST

## 2024-09-13 PROCEDURE — 97110 THERAPEUTIC EXERCISES: CPT | Performed by: PHYSICAL THERAPIST

## 2024-09-13 NOTE — PROGRESS NOTES
"Daily Note     Today's date: 2024  Patient name: Padma Jarrell  : 1975  MRN: [id number]  Referring provider: Zay Pinon MD  Dx:   Encounter Diagnosis     ICD-10-CM    1. Sprain of other ligament of right knee, initial encounter  S83.8X1A       2. Acute pain of right knee  M25.561           Start Time: 0730  Stop Time: 0815  Total time in clinic (min): 45 minutes    Subjective: Pt reporting pain 2/10 \"soreness, achiness\" in R knee region. Pt has not taken Mobic today and also has discharged her R knee brace. Pt reporting compliance with HEP      Objective: See treatment diary below      Assessment: Tolerated treatment well. Emphasis of tx on neuro re-education of R quads with all therex. Focus on LE gentle stretching and strengthening therex.  Patient demonstrated fatigue post treatment, exhibited good technique with therapeutic exercises, and would benefit from continued PT      Plan: Continue per plan of care.  Progress treatment as tolerated.  Add sidestepping NV.            Insurance:  AMA/CMS Eval/ Re-eval POC expires FOTO Auth #/ Referral # Total units  Start date  Expiration date Extension  Visit limitation?  PT only or  PT+OT? Co-Insurance   [employer]         30 visits/jose year  $0                                                                  Date              Units:  Used 1 1             Authed:  Remaining                    Date               Units:  Used               Authed:  Remaining                      Date        Visit Number IE        Manual         PROM R knee RL RL       R knee patellar mobs RL  RL                                  TherEx         Nu step  X5 min lev 5 arms/legs       bridge         Heel slides on ball  2x5 red peanut rolls hold 5       Hamstring stretch with SOS supine X5 hold 10 sec X5 hold 10 SOS supine         Bridge on ball 2x5 hold 5 legs extended.                                   Neuro Re-Ed         Glut sets with hip add " "iso  X10 hold 5 with hip add iso       SLR flexion X5 hold 3 X5 hold 5       LAQ X5 with ankle DF x5 hold 5 ankleDF ea leg       Quad sets X5 hold 5 2x5 hold 5       TRX squats  X5 hold 10 sec        SAQ           Prostretch x5 hold 10 sec ea         Lunge stretch x 5 ea hold 10 sec ea leg 8\" step       TherAct         Patient education HEP issued and reviewed HEP reviewed and reissued                                           Gait Training                                    Modalities                      Precautions: acute R knee pain  Past Medical History:   Diagnosis Date    Breast disorder     Depression     Disease of thyroid gland     Hyperlipidemia     Other abnormal findings in urine 10/25/2006    Pancreatic pseudocyst 06/29/2011    Peptic ulcer     last assessed 01/27/16    Viral gastroenteritis 12/22/2010              "

## 2024-09-17 ENCOUNTER — ANESTHESIA (OUTPATIENT)
Dept: ANESTHESIOLOGY | Facility: HOSPITAL | Age: 49
End: 2024-09-17

## 2024-09-17 ENCOUNTER — ANESTHESIA EVENT (OUTPATIENT)
Dept: ANESTHESIOLOGY | Facility: HOSPITAL | Age: 49
End: 2024-09-17

## 2024-09-20 ENCOUNTER — OFFICE VISIT (OUTPATIENT)
Dept: PHYSICAL THERAPY | Facility: CLINIC | Age: 49
End: 2024-09-20
Payer: COMMERCIAL

## 2024-09-20 DIAGNOSIS — M25.561 ACUTE PAIN OF RIGHT KNEE: ICD-10-CM

## 2024-09-20 DIAGNOSIS — S83.8X1A SPRAIN OF OTHER LIGAMENT OF RIGHT KNEE, INITIAL ENCOUNTER: Primary | ICD-10-CM

## 2024-09-20 PROCEDURE — 97110 THERAPEUTIC EXERCISES: CPT | Performed by: PHYSICAL THERAPIST

## 2024-09-20 PROCEDURE — 97112 NEUROMUSCULAR REEDUCATION: CPT | Performed by: PHYSICAL THERAPIST

## 2024-09-20 NOTE — PROGRESS NOTES
"Daily Note     Today's date: 2024  Patient name: Padma Jarrell  : 1975  MRN: [id number]  Referring provider: Zay Pinon MD  Dx:   Encounter Diagnosis     ICD-10-CM    1. Sprain of other ligament of right knee, initial encounter  S83.8X1A       2. Acute pain of right knee  M25.561           Start Time: 725  Stop Time: 815  Total time in clinic (min): 50 minutes    Subjective: Pt reporting pain 1/10 \"soreness, achiness\" in R  medial knee region. Pt has not taken Mobic today and also has discharged her R knee brace. Pt wearing KEDS and advised pt to wear more supportive arch support sneakers. Pt reporting compliance with HEP      Objective: See treatment diary below      Assessment: Tolerated treatment well. Emphasis of tx on neuro re-education of R quads with all therex. Focus on LE gentle stretching and strengthening therex.  Advanced to standing dynamic therex with some c/o mild medial R knee pain with sidestepping and mini TRX squats. Emphasis on core stabilization with all therex. Patient demonstrated fatigue post treatment, exhibited good technique with therapeutic exercises, and would benefit from continued PT. NO restrictions noted with R patellar mobs.      Plan: Continue per plan of care.  Progress treatment as tolerated.  Continue sidestepping NV.            Insurance:  AMA/CMS Eval/ Re-eval POC expires FOTO Auth #/ Referral # Total units  Start date  Expiration date Extension  Visit limitation?  PT only or  PT+OT? Co-Insurance   [employer]         30 visits/jose year  $0                                                                  Date             Units:  Used 1 1 1            Authed:  Remaining                   Date               Units:  Used               Authed:  Remaining                      Date       Visit Number IE        Manual         PROM R knee RL RL RL      R knee patellar mobs RL  RL RL                                 TherEx     " "    Nu step  X5 min lev 5 arms/legs X6 min lev 5 arms/legs      bridge         Heel slides on ball  2x5 red peanut rolls hold 5 2x10 peanut rolls hold 5      Hamstring stretch with SOS supine X5 hold 10 sec X5 hold 10 SOS supine X10 hold 10 SOS supine        Bridge on ball 2x5 hold 5 legs extended.  Bridge on ball 2x10 legs extended         Ball heel slides 2x10 hold 5                        Neuro Re-Ed         Glut sets with hip add iso  X10 hold 5 with hip add iso X10 hold 5 with hip add iso       SLR flexion X5 hold 3 X5 hold 5 X5 hold 5      LAQ X5 with ankle DF x5 hold 5 ankleDF ea leg 2x5 hold 5      Quad sets X5 hold 5 2x5 hold 5 2x5 hold 5      TRX squats  X5 hold 10 sec  X5 hold 10 sec with medial knee pain 1/10      SAQ           Prostretch x5 hold 10 sec ea Prostretch x5 hold 10 sec ea        Lunge stretch x 5 ea hold 10 sec ea leg 8\" step Lunge stretch x5 hold 10 sec 8\" step      TherAct   Sidestepping 10'x2 with medial R knee pain 1/10      Patient education HEP issued and reviewed HEP reviewed and reissued HEP reviewed                                          Gait Training                                    Modalities                      Precautions: acute R knee pain  Past Medical History:   Diagnosis Date    Breast disorder     Depression     Disease of thyroid gland     Hyperlipidemia     Other abnormal findings in urine 10/25/2006    Pancreatic pseudocyst 06/29/2011    Peptic ulcer     last assessed 01/27/16    Viral gastroenteritis 12/22/2010              "

## 2024-09-27 ENCOUNTER — OFFICE VISIT (OUTPATIENT)
Dept: PHYSICAL THERAPY | Facility: CLINIC | Age: 49
End: 2024-09-27
Payer: COMMERCIAL

## 2024-09-27 DIAGNOSIS — S83.8X1A SPRAIN OF OTHER LIGAMENT OF RIGHT KNEE, INITIAL ENCOUNTER: Primary | ICD-10-CM

## 2024-09-27 DIAGNOSIS — M25.561 ACUTE PAIN OF RIGHT KNEE: ICD-10-CM

## 2024-09-27 PROCEDURE — 97110 THERAPEUTIC EXERCISES: CPT | Performed by: PHYSICAL THERAPIST

## 2024-09-27 PROCEDURE — 97112 NEUROMUSCULAR REEDUCATION: CPT | Performed by: PHYSICAL THERAPIST

## 2024-09-27 NOTE — PROGRESS NOTES
"Daily Note     Today's date: 2024  Patient name: Padma Jarrell  : 1975  MRN: [id number]  Referring provider: Zay Pinon MD  Dx:   Encounter Diagnosis     ICD-10-CM    1. Sprain of other ligament of right knee, initial encounter  S83.8X1A       2. Acute pain of right knee  M25.561           Start Time: 0730  Stop Time: 0810  Total time in clinic (min): 40 minutes    Subjective: Pt reporting pain 0/10 in R  medial knee region. Pt reporting she had an episode of R knee pain yesterday \"achy/sore but then knee cracked and it was better\" Pt has not taken Mobic today and also has discharged her R knee brace. Pt wearing supportive sneakers today. Pt reporting compliance with HEP      Objective: See treatment diary below      Assessment: Tolerated treatment well. Emphasis of tx on neuro re-education of R quads with all therex. Focus on LE gentle stretching and strengthening therex.  Advanced to standing dynamic therex with TB with some c/o mild medial R knee pain with sidestepping towards end of exercise due to fatigue. Emphasis on core stabilization with all therex. Patient demonstrated fatigue post treatment, exhibited good technique with therapeutic exercises, and would benefit from continued PT. NO restrictions noted with R patellar mobs. Pt progressing with strength as compared to SOC.      Plan: Continue per plan of care.  Progress treatment as tolerated.  Continue sidestepping NV with TB. Re-eval NV            Insurance:  AMA/CMS Eval/ Re-eval POC expires FOTO Auth #/ Referral # Total units  Start date  Expiration date Extension  Visit limitation?  PT only or  PT+OT? Co-Insurance   [employer]         30 visits/jose year  $0                                                                  Date            Units:  Used            Authed:  Remaining                  Date               Units:  Used               Authed:  Remaining                      Date  " "9/13 9/20 9/27     Visit Number IE        Manual         PROM R knee RL RL RL RL     R knee patellar mobs RL  RL RL RL                                TherEx         Nu step  X5 min lev 5 arms/legs X6 min lev 5 arms/legs -     bridge         Heel slides on ball  2x5 red peanut rolls hold 5 2x10 peanut rolls hold 5 2x10 peanut rolls hold 5     Hamstring stretch with SOS supine X5 hold 10 sec X5 hold 10 SOS supine X10 hold 10 SOS supine X10 hold 10 SOS supine       Bridge on ball 2x5 hold 5 legs extended.  Bridge on ball 2x10 legs extended Bridge on ball 2x10 legs extended        Ball heel slides 2x10 hold 5 Ball heel slides 2x10 hold 5         Bridge on ball with knees flexed 2x10              Neuro Re-Ed         Glut sets with hip add iso  X10 hold 5 with hip add iso X10 hold 5 with hip add iso  -     SLR flexion X5 hold 3 X5 hold 5 X5 hold 5 X10 hold 5     LAQ X5 with ankle DF x5 hold 5 ankleDF ea leg 2x5 hold 5 -     Quad sets X5 hold 5 2x5 hold 5 2x5 hold 5 X10 hold 5     TRX squats  X5 hold 10 sec  X5 hold 10 sec with medial knee pain 1/10 X20 hold 5 with glut set     SAQ           Prostretch x5 hold 10 sec ea Prostretch x5 hold 10 sec ea Prostretch x 10 hold 10 ea with R foot ER x10 hold 10       Lunge stretch x 5 ea hold 10 sec ea leg 8\" step Lunge stretch x5 hold 10 sec 8\" step -     TherAct   Sidestepping 10'x2 with medial R knee pain 1/10 Sidestepping 10'x3 with blue (light band)      Patient education HEP issued and reviewed HEP reviewed and reissued HEP reviewed HEP reviewed                                         Gait Training                                    Modalities                      Precautions: acute R knee pain  Past Medical History:   Diagnosis Date    Breast disorder     Depression     Disease of thyroid gland     Hyperlipidemia     Other abnormal findings in urine 10/25/2006    Pancreatic pseudocyst 06/29/2011    Peptic ulcer     last assessed 01/27/16    Viral gastroenteritis 12/22/2010 "

## 2024-10-04 ENCOUNTER — OFFICE VISIT (OUTPATIENT)
Dept: PHYSICAL THERAPY | Facility: CLINIC | Age: 49
End: 2024-10-04
Payer: COMMERCIAL

## 2024-10-04 DIAGNOSIS — M25.561 ACUTE PAIN OF RIGHT KNEE: ICD-10-CM

## 2024-10-04 DIAGNOSIS — S83.8X1A SPRAIN OF OTHER LIGAMENT OF RIGHT KNEE, INITIAL ENCOUNTER: Primary | ICD-10-CM

## 2024-10-04 PROCEDURE — 97112 NEUROMUSCULAR REEDUCATION: CPT | Performed by: PHYSICAL THERAPIST

## 2024-10-04 PROCEDURE — 97110 THERAPEUTIC EXERCISES: CPT | Performed by: PHYSICAL THERAPIST

## 2024-10-04 NOTE — PROGRESS NOTES
Daily Note     Today's date: 10/4/2024  Patient name: Padma Jarrell  : 1975  MRN: [id number]  Referring provider: Zay Pinon MD  Dx:   Encounter Diagnosis     ICD-10-CM    1. Sprain of other ligament of right knee, initial encounter  S83.8X1A       2. Acute pain of right knee  M25.561           Start Time: 725  Stop Time: 0810  Total time in clinic (min): 45 minutes    Subjective: Pt reporting pain 0/10 in R  medial knee region. Pt reporting she feels she is able to perform all ADLs painfree and able to perform stairs.  Pt wearing supportive sneakers today. Pt reporting compliance with HEP. Pt pleased with progress at this time      Objective: See treatment diary below      Assessment: Tolerated treatment well. Emphasis of tx on neuro re-education of R quads with all therex. Focus on LE gentle stretching and strengthening therex.  Continued with standing dynamic therex with TB with no c/o medial R knee pain as compared to last visit where she had experienced mild R knee medial pain. Pt demonstrating increase in balance today with R SLS therex. Emphasis on core stabilization with all therex. Patient demonstrated fatigue post treatment, exhibited good technique with therapeutic exercises, and would benefit from continued PT. NO restrictions noted with R patellar mobs. Pt progressing with strength as compared to SOC.      Plan: Continue per plan of care.  Progress treatment as tolerated.  Pt to see Dr. Pinon on Tuesday 10/8 for follow up.   Re-evdawna DELGADO. Update HEP NV, potential DC to HEP            Insurance:  AMA/CMS Eval/ Re-eval POC expires FOTO Auth #/ Referral # Total units  Start date  Expiration date Extension  Visit limitation?  PT only or  PT+OT? Co-Insurance   [employer]         30 visits/jose year  $0                                                                  Date 9/9 9/13 9/20 9/27 10/4          Units:  Used 1 1 1 1 1          Authed:  Remaining  29 28 27 26 25                "Date               Units:  Used               Authed:  Remaining                      Date 9/9 9/13 9/20 9/27 10/4    Visit Number IE        Manual         PROM R knee RL RL RL RL RL    R knee patellar mobs RL  RL RL RL RL                               TherEx         Nu step  X5 min lev 5 arms/legs X6 min lev 5 arms/legs -     bridge         Heel slides on ball  2x5 red peanut rolls hold 5 2x10 peanut rolls hold 5 2x10 peanut rolls hold 5 2x10 peanut rolls hold 5    Hamstring stretch with SOS supine X5 hold 10 sec X5 hold 10 SOS supine X10 hold 10 SOS supine X10 hold 10 SOS supine X10 hold 10 SOS supine       Bridge on ball 2x5 hold 5 legs extended.  Bridge on ball 2x10 legs extended Bridge on ball 2x10 legs extended Bridge on ball 2x10 legs extended       Ball heel slides 2x10 hold 5 Ball heel slides 2x10 hold 5 Ball heel slides 2x10 hold 5        Bridge on ball with knees flexed 2x10 Bridge with ball knees flexed 2x10             Neuro Re-Ed         Glut sets with hip add iso  X10 hold 5 with hip add iso X10 hold 5 with hip add iso  -     SLR flexion X5 hold 3 X5 hold 5 X5 hold 5 X10 hold 5     LAQ X5 with ankle DF x5 hold 5 ankleDF ea leg 2x5 hold 5 - Hip march on foam, SLS balance, 2x10 hold 5    Quad sets X5 hold 5 2x5 hold 5 2x5 hold 5 X10 hold 5     TRX squats  X5 hold 10 sec  X5 hold 10 sec with medial knee pain 1/10 X20 hold 5 with glut set     SAQ           Prostretch x5 hold 10 sec ea Prostretch x5 hold 10 sec ea Prostretch x 10 hold 10 ea with R foot ER x10 hold 10       Lunge stretch x 5 ea hold 10 sec ea leg 8\" step Lunge stretch x5 hold 10 sec 8\" step - Lunge stretch x10 hold 10 sec 8\"     TherAct   Sidestepping 10'x2 with medial R knee pain 1/10 Sidestepping 10'x3 with blue (light band)      Patient education HEP issued and reviewed HEP reviewed and reissued HEP reviewed HEP reviewed HEP reviewed         TRX squats 2x10         SL lunge on 6\" step x 20 ea hold 5                      Gait Training     "                                Modalities                      Precautions: acute R knee pain  Past Medical History:   Diagnosis Date    Breast disorder     Depression     Disease of thyroid gland     Hyperlipidemia     Other abnormal findings in urine 10/25/2006    Pancreatic pseudocyst 06/29/2011    Peptic ulcer     last assessed 01/27/16    Viral gastroenteritis 12/22/2010

## 2024-10-08 ENCOUNTER — OFFICE VISIT (OUTPATIENT)
Dept: OBGYN CLINIC | Facility: CLINIC | Age: 49
End: 2024-10-08
Payer: COMMERCIAL

## 2024-10-08 VITALS
HEIGHT: 62 IN | SYSTOLIC BLOOD PRESSURE: 134 MMHG | DIASTOLIC BLOOD PRESSURE: 78 MMHG | BODY MASS INDEX: 31.47 KG/M2 | WEIGHT: 171 LBS | HEART RATE: 75 BPM

## 2024-10-08 DIAGNOSIS — S83.8X1D SPRAIN OF OTHER LIGAMENT OF RIGHT KNEE, SUBSEQUENT ENCOUNTER: Primary | ICD-10-CM

## 2024-10-08 PROCEDURE — 99213 OFFICE O/P EST LOW 20 MIN: CPT | Performed by: ORTHOPAEDIC SURGERY

## 2024-10-08 NOTE — PROGRESS NOTES
Assessment/Plan:  1. Sprain of other ligament of right knee, subsequent encounter          Scribe Attestation      I,:  Elieser Andrade MA am acting as a scribe while in the presence of the attending physician.:       I,:  Zay Pinon MD personally performed the services described in this documentation    as scribed in my presence.:               Padma's right knee pain has resolved.  Her knee is stable to my examination today.  I did encourage her to continue on with her final visit at physical therapy to receive at home exercise program.  I stressed the importance of maintaining strength and flexibility of the lower extremities.  She can follow-up with me as needed.    Subjective:   Padma Jarrell is a 48 y.o. adult who presents to the office today for follow-up evaluation of the right knee.  Padma states she feels the right knee pain has resolved.  She states for the past week she has had no pain.  She has been attending physical therapy and is pleased with her progress there.  She denies mechanical symptoms such as clicking, popping, buckling or the knee giving away.  She denies recent swelling to the knee.  At the end of her day her knee pain is nonexistent and she denies swelling.  She has 1 final visit with her physical therapist who intends to provide her a home exercise program.      Review of Systems   Constitutional:  Negative for chills, fever and unexpected weight change.   HENT:  Negative for hearing loss, nosebleeds and sore throat.    Eyes:  Negative for pain, redness and visual disturbance.   Respiratory:  Negative for cough, shortness of breath and wheezing.    Cardiovascular:  Negative for chest pain, palpitations and leg swelling.   Gastrointestinal:  Negative for abdominal pain, nausea and vomiting.   Endocrine: Negative for polyphagia and polyuria.   Genitourinary:  Negative for dysuria and hematuria.   Musculoskeletal:         See HPI   Skin:  Negative for rash and wound.   Neurological:   Negative for dizziness, numbness and headaches.   Psychiatric/Behavioral:  Negative for decreased concentration and suicidal ideas. The patient is not nervous/anxious.          Past Medical History:   Diagnosis Date    Breast disorder     Depression     Disease of thyroid gland     Hyperlipidemia     Other abnormal findings in urine 10/25/2006    Pancreatic pseudocyst 2011    Peptic ulcer     last assessed 16    Viral gastroenteritis 2010       Past Surgical History:   Procedure Laterality Date     SECTION      CHOLECYSTECTOMY  2019    TUBAL LIGATION         Family History   Problem Relation Age of Onset    No Known Problems Mother     Thyroid disease Father     Prostate cancer Father 70    No Known Problems Daughter     No Known Problems Maternal Grandmother     No Known Problems Maternal Grandfather     No Known Problems Paternal Grandmother     No Known Problems Paternal Grandfather     No Known Problems Son     No Known Problems Paternal Aunt     No Known Problems Paternal Aunt        Social History     Occupational History    Occupation: RN   Tobacco Use    Smoking status: Never    Smokeless tobacco: Never   Vaping Use    Vaping status: Never Used   Substance and Sexual Activity    Alcohol use: No    Drug use: No    Sexual activity: Yes     Partners: Male     Birth control/protection: Female Sterilization     Comment: pt declines std hiv testing         Current Outpatient Medications:     ALPRAZolam (XANAX) 0.25 mg tablet, Take 1 tablet (0.25 mg total) by mouth 2 (two) times a day as needed for anxiety, Disp: 15 tablet, Rfl: 0    meloxicam (Mobic) 7.5 mg tablet, Take 1 tablet (7.5 mg total) by mouth daily, Disp: 30 tablet, Rfl: 1    metoprolol tartrate (LOPRESSOR) 25 mg tablet, Take 25 mg by mouth daily., Disp: , Rfl:     MISC NATURAL PRODUCTS ER PO, Take by mouth, Disp: , Rfl:     Multiple Vitamin (MULTI-VITAMIN DAILY PO), Take by mouth, Disp: , Rfl:     Omega 3-6-9 Fatty Acids  (OMEGA 3-6-9 COMPLEX PO), Take 1 capsule by mouth in the morning, Disp: , Rfl:     Tranexamic Acid 650 MG TABS, Take 2 tablets (1,300 mg total) by mouth 3 (three) times a day as needed (heavy menses), Disp: 30 tablet, Rfl: 1    Ubrogepant (UBRELVY) 50 MG tablet, Take 50 mg by mouth if needed, Disp: , Rfl:     Multiple Vitamins-Minerals (DAILY MULTI VITAMIN/MINERALS PO), Take by mouth daily (Patient not taking: Reported on 9/4/2024), Disp: , Rfl:     rosuvastatin (CRESTOR) 5 mg tablet, Take 5 mg by mouth daily, Disp: , Rfl:     Allergies   Allergen Reactions    Penicillins        Objective:  Vitals:    10/08/24 0812   BP: 134/78   Pulse: 75       Right Knee Exam     Muscle Strength   The patient has normal right knee strength.    Tenderness   The patient is experiencing no tenderness.     Range of Motion   Extension:  0   Flexion:  130     Tests   Jolanta:  Medial - negative Lateral - negative  Varus: negative Valgus: negative  Lachman:  Anterior - negative    Posterior - negative  Drawer:  Anterior - negative    Posterior - negative  Patellar apprehension: negative    Other   Erythema: absent  Scars: absent  Sensation: normal  Swelling: none  Effusion: no effusion present          Observations     Right Knee   Negative for effusion.       Physical Exam  Vitals reviewed.   Constitutional:       Appearance: She is well-developed.   HENT:      Head: Normocephalic and atraumatic.   Eyes:      General:         Right eye: No discharge.         Left eye: No discharge.      Conjunctiva/sclera: Conjunctivae normal.   Cardiovascular:      Rate and Rhythm: Regular rhythm.   Pulmonary:      Effort: Pulmonary effort is normal. No respiratory distress.      Breath sounds: No stridor.   Musculoskeletal:      Cervical back: Normal range of motion and neck supple.      Right knee: No effusion.      Instability Tests: Medial Jolanta test negative and lateral Jolanta test negative.      Comments: As noted in the HPI.   Skin:      General: Skin is warm and dry.   Neurological:      Mental Status: She is alert and oriented to person, place, and time.   Psychiatric:         Behavior: Behavior normal.               This document was created using speech voice recognition software.   Grammatical errors, random word insertions, pronoun errors, and incomplete sentences are an occasional consequence of this system due to software limitations, ambient noise, and hardware issues.   Any formal questions or concerns about content, text, or information contained within the body of this dictation should be directly addressed to the provider for clarification.

## 2024-10-11 ENCOUNTER — OFFICE VISIT (OUTPATIENT)
Dept: PHYSICAL THERAPY | Facility: CLINIC | Age: 49
End: 2024-10-11
Payer: COMMERCIAL

## 2024-10-11 DIAGNOSIS — S83.8X1A SPRAIN OF OTHER LIGAMENT OF RIGHT KNEE, INITIAL ENCOUNTER: Primary | ICD-10-CM

## 2024-10-11 DIAGNOSIS — M25.561 ACUTE PAIN OF RIGHT KNEE: ICD-10-CM

## 2024-10-11 PROCEDURE — 97110 THERAPEUTIC EXERCISES: CPT | Performed by: PHYSICAL THERAPIST

## 2024-10-11 PROCEDURE — 97112 NEUROMUSCULAR REEDUCATION: CPT | Performed by: PHYSICAL THERAPIST

## 2024-10-11 NOTE — PROGRESS NOTES
Daily Note/Discharge note:     Today's date: 10/11/2024  Patient name: Padma Jarrell  : 1975  MRN: [id number]  Referring provider: Zay Pinon MD  Dx:   Encounter Diagnosis     ICD-10-CM    1. Sprain of other ligament of right knee, initial encounter  S83.8X1A       2. Acute pain of right knee  M25.561           Start Time: 0730  Stop Time: 0800  Total time in clinic (min): 30 minutes    Subjective: Pt reporting pain 0/10 in R  medial knee region. Pt reporting she feels she is able to perform all ADLs painfree and able to perform stairs.  Pt wearing supportive sneakers today. Pt reporting compliance with HEP. Pt pleased with progress at this time. Pt reporting she feels she is ready for DC to HEP.       Objective: See treatment diary below      Assessment: Tolerated treatment well. Emphasis of tx on neuro re-education of R quads with all therex. Focus on LE gentle stretching and strengthening therex. No c/o medial R knee pain with all therex today. Pt demonstrating increase in balance today with R SLS therex. Emphasis on core stabilization with all therex. Patient has met all PT goals and ready for DC to HEP, all questions answered. NO restrictions noted with R patellar mobs. Pt progressing with strength as compared to SOC.      Plan:  DC to HEP.             Insurance:  AMA/CMS Eval/ Re-eval POC expires FOTO Auth #/ Referral # Total units  Start date  Expiration date Extension  Visit limitation?  PT only or  PT+OT? Co-Insurance   [employer]         30 visits/ year  $0                                                                  Date 9/9 9/13 9/20 9/27 10/4 10/11         Units:  Used 1 1          Authed:  Remaining  29 28 27 26 25 24              Date               Units:  Used               Authed:  Remaining                      Date 9/9 9/13 9/20 9/27 10/4 10/11 DC   Visit Number IE      6th visit   Manual         PROM R knee RL RL RL RL RL RL   R knee patellar mobs RL  RL RL RL  "RL RL                              TherEx         Nu step  X5 min lev 5 arms/legs X6 min lev 5 arms/legs -     bridge         Heel slides on ball  2x5 red peanut rolls hold 5 2x10 peanut rolls hold 5 2x10 peanut rolls hold 5 2x10 peanut rolls hold 5 2x10 peanut rolls hold 5   Hamstring stretch with SOS supine X5 hold 10 sec X5 hold 10 SOS supine X10 hold 10 SOS supine X10 hold 10 SOS supine X10 hold 10 SOS supine  X10 hold 10 SOS supine     Bridge on ball 2x5 hold 5 legs extended.  Bridge on ball 2x10 legs extended Bridge on ball 2x10 legs extended Bridge on ball 2x10 legs extended Bridge on ball 2x10 legs extended      Ball heel slides 2x10 hold 5 Ball heel slides 2x10 hold 5 Ball heel slides 2x10 hold 5 Ball heel slides 2x10 hold 5       Bridge on ball with knees flexed 2x10 Bridge with ball knees flexed 2x10 Bridge with ball knees fixed 2x10            Neuro Re-Ed         Glut sets with hip add iso  X10 hold 5 with hip add iso X10 hold 5 with hip add iso  -     SLR flexion X5 hold 3 X5 hold 5 X5 hold 5 X10 hold 5     LAQ X5 with ankle DF x5 hold 5 ankleDF ea leg 2x5 hold 5 - Hip march on foam, SLS balance, 2x10 hold 5    Quad sets X5 hold 5 2x5 hold 5 2x5 hold 5 X10 hold 5     TRX squats  X5 hold 10 sec  X5 hold 10 sec with medial knee pain 1/10 X20 hold 5 with glut set     SAQ           Prostretch x5 hold 10 sec ea Prostretch x5 hold 10 sec ea Prostretch x 10 hold 10 ea with R foot ER x10 hold 10       Lunge stretch x 5 ea hold 10 sec ea leg 8\" step Lunge stretch x5 hold 10 sec 8\" step - Lunge stretch x10 hold 10 sec 8\"  Lunge stretch x10 hold 10 sec   TherAct   Sidestepping 10'x2 with medial R knee pain 1/10 Sidestepping 10'x3 with blue (light band)      Patient education HEP issued and reviewed HEP reviewed and reissued HEP reviewed HEP reviewed HEP reviewed HEP reviewed        TRX squats 2x10 TRX squats 2x10        SL lunge on 6\" step x 20 ea hold 5 SL lunge on 6\" step with balance SLS                   "   Gait Training                                    Modalities                      Precautions: acute R knee pain  Past Medical History:   Diagnosis Date    Breast disorder     Depression     Disease of thyroid gland     Hyperlipidemia     Other abnormal findings in urine 10/25/2006    Pancreatic pseudocyst 06/29/2011    Peptic ulcer     last assessed 01/27/16    Viral gastroenteritis 12/22/2010

## 2024-10-17 ENCOUNTER — ANNUAL EXAM (OUTPATIENT)
Dept: OBGYN CLINIC | Facility: CLINIC | Age: 49
End: 2024-10-17
Payer: COMMERCIAL

## 2024-10-17 VITALS
OXYGEN SATURATION: 98 % | BODY MASS INDEX: 31.83 KG/M2 | DIASTOLIC BLOOD PRESSURE: 60 MMHG | HEART RATE: 76 BPM | WEIGHT: 174 LBS | SYSTOLIC BLOOD PRESSURE: 100 MMHG

## 2024-10-17 DIAGNOSIS — Z01.419 ENCOUNTER FOR GYNECOLOGICAL EXAMINATION WITHOUT ABNORMAL FINDING: Primary | ICD-10-CM

## 2024-10-17 DIAGNOSIS — N94.3 PMS (PREMENSTRUAL SYNDROME): ICD-10-CM

## 2024-10-17 DIAGNOSIS — N92.1 MENORRHAGIA WITH IRREGULAR CYCLE: ICD-10-CM

## 2024-10-17 DIAGNOSIS — N92.0 MENORRHAGIA WITH REGULAR CYCLE: ICD-10-CM

## 2024-10-17 DIAGNOSIS — Z13.9 ENCOUNTER FOR HEALTH-RELATED SCREENING: ICD-10-CM

## 2024-10-17 PROCEDURE — 99396 PREV VISIT EST AGE 40-64: CPT | Performed by: OBSTETRICS & GYNECOLOGY

## 2024-10-17 RX ORDER — TRANEXAMIC ACID 650 MG/1
1300 TABLET ORAL 3 TIMES DAILY PRN
Qty: 30 TABLET | Refills: 4 | Status: SHIPPED | OUTPATIENT
Start: 2024-10-17

## 2024-10-17 RX ORDER — ALPRAZOLAM 0.25 MG/1
0.25 TABLET ORAL 2 TIMES DAILY PRN
Qty: 15 TABLET | Refills: 0 | Status: SHIPPED | OUTPATIENT
Start: 2024-10-17

## 2024-10-17 NOTE — PROGRESS NOTES
Subjective      Padma Jarrell is a 48 y.o. G2, P2 female who presents for annual well woman exam. Periods are regular every 28-30 days, lasting 9 days.  Menses are better controlled now that she is using the Lysteda regularly.  Patient still notes some PMS symptoms and also skipped her August menses but otherwise seems relatively regulated.  Discussed and reviewed with patient she would like to hold off on surgery if she is able to at this time and use the Lysteda as needed.  We discussed if this gave her sufficient control and her blood count and iron were appropriate that we could continue with this method for now.  No intermenstrual bleeding, spotting, or discharge.  Patient reports No hot flashes/night sweats, No pain problems intercourse, No vaginal dryness, sleeping fairly well.    Current contraception: tubal ligation  History of abnormal Pap smear: yes -history of abnormal in the past with positive non 16/18 high risk HPV  Family history of uterine or ovarian cancer: no  Regular self breast exam: yes  History of abnormal mammogram: no  Family history of breast cancer: no    Menstrual History:  OB History          2    Para   2    Term   0            AB        Living   2         SAB        IAB        Ectopic        Multiple        Live Births   2           Obstetric Comments   Menarche-Age 13              Menarche age: 13  Patient's last menstrual period was 10/02/2024.       The following portions of the patient's history were reviewed and updated as appropriate: allergies, current medications, past family history, past medical history, past social history, past surgical history, and problem list.  Past Medical History:   Diagnosis Date    Breast disorder     Depression     Disease of thyroid gland     Hyperlipidemia     Other abnormal findings in urine 10/25/2006    Pancreatic pseudocyst 2011    Peptic ulcer     last assessed 18    Viral gastroenteritis 2010     Past  Surgical History:   Procedure Laterality Date     SECTION      CHOLECYSTECTOMY  2019    TUBAL LIGATION       OB History          2    Para   2    Term   0            AB        Living   2         SAB        IAB        Ectopic        Multiple        Live Births   2           Obstetric Comments   Menarche-Age 13               Review of Systems  Review of Systems   Constitutional:  Negative for fatigue, fever and unexpected weight change.   HENT:  Negative for dental problem, sinus pressure and sinus pain.    Eyes:  Negative for visual disturbance.   Respiratory:  Negative for cough, shortness of breath and wheezing.    Cardiovascular:  Negative for chest pain and leg swelling.   Gastrointestinal:  Negative for blood in stool, constipation, diarrhea, nausea and vomiting.   Endocrine: Negative for cold intolerance, heat intolerance and polydipsia.   Genitourinary:  Negative for dysuria, frequency, hematuria, menstrual problem and pelvic pain.   Musculoskeletal:  Negative for arthralgias and back pain.   Neurological:  Negative for dizziness, seizures and headaches.   Psychiatric/Behavioral:  The patient is not nervous/anxious.       Objective      /60   Pulse 76   Wt 78.9 kg (174 lb)   LMP 10/02/2024   SpO2 98%   BMI 31.83 kg/m²   Vitals:    10/17/24 0802   BP: 100/60   Pulse: 76   SpO2: 98%   Weight: 78.9 kg (174 lb)     General:   alert and oriented, in no acute distress   Heart: regular rate and rhythm, S1, S2 normal, no murmur, click, rub or gallop   Lungs: clear to auscultation bilaterally   Abdomen: soft, non-tender, without masses or organomegaly   Vulva: normal   Vagina: normal mucosa   Cervix: no bleeding following Pap, no cervical motion tenderness, and no lesions   Uterus: normal size, mobile, non-tender   Adnexa: normal adnexa and no mass, fullness, tenderness   Breast inspection negative, no nipple discharge or bleeding, no masses or nodularity palpable  Rectal  negative, stool guaiac negative  Thyroid normal no masses or nodules     Assessment   48-year-old G2, P2 with menorrhagia currently relatively controlled on Lysteda.     Plan   CBC and iron, ThinPrep with cotesting performed, Lysteda renewed return in 1 year or sooner as needed

## 2024-11-01 DIAGNOSIS — M25.461 EFFUSION OF RIGHT KNEE: ICD-10-CM

## 2024-11-01 DIAGNOSIS — M25.561 ACUTE PAIN OF RIGHT KNEE: ICD-10-CM

## 2024-11-01 RX ORDER — MELOXICAM 7.5 MG/1
7.5 TABLET ORAL DAILY
Qty: 30 TABLET | Refills: 1 | Status: SHIPPED | OUTPATIENT
Start: 2024-11-01

## 2025-02-05 ENCOUNTER — TELEPHONE (OUTPATIENT)
Age: 50
End: 2025-02-05

## 2025-02-05 DIAGNOSIS — Z12.31 ENCOUNTER FOR SCREENING MAMMOGRAM FOR BREAST CANCER: Primary | ICD-10-CM

## 2025-05-15 DIAGNOSIS — N92.1 MENORRHAGIA WITH IRREGULAR CYCLE: ICD-10-CM

## 2025-05-15 RX ORDER — TRANEXAMIC ACID 650 MG/1
1300 TABLET ORAL 3 TIMES DAILY PRN
Qty: 30 TABLET | Refills: 0 | Status: SHIPPED | OUTPATIENT
Start: 2025-05-15

## 2025-05-15 NOTE — TELEPHONE ENCOUNTER
Reason for call:   [x] Refill   [] Prior Auth  [] Other:     Office:   [] PCP/Provider -   [x] Specialty/Provider -  [facility]  Authorized By: Shelby Adams MD    Medication:     Tranexamic Acid 650 MG TABS 1,300 mg, 3 times daily PRN         Pharmacy: [id number] - [place], [place] - [address] [place] [address] [phone]     Local Pharmacy   Does the patient have enough for 3 days?   [] Yes   [x] No - Send as HP to POD    Mail Away Pharmacy   Does the patient have enough for 10 days?   [] Yes   [] No - Send as HP to POD  wwwww

## 2025-06-18 ENCOUNTER — HOSPITAL ENCOUNTER (OUTPATIENT)
Dept: MAMMOGRAPHY | Facility: HOSPITAL | Age: 50
Discharge: HOME | End: 2025-06-18
Attending: OBSTETRICS & GYNECOLOGY
Payer: COMMERCIAL

## 2025-06-18 VITALS — BODY MASS INDEX: 32.02 KG/M2 | WEIGHT: 174 LBS | HEIGHT: 62 IN

## 2025-06-18 DIAGNOSIS — Z12.31 ENCOUNTER FOR SCREENING MAMMOGRAM FOR BREAST CANCER: ICD-10-CM

## 2025-06-18 PROCEDURE — 77063 BREAST TOMOSYNTHESIS BI: CPT

## 2025-06-18 PROCEDURE — 77067 SCR MAMMO BI INCL CAD: CPT

## 2025-07-02 DIAGNOSIS — N94.3 PMS (PREMENSTRUAL SYNDROME): ICD-10-CM

## 2025-07-02 RX ORDER — ALPRAZOLAM 0.25 MG
0.25 TABLET ORAL 2 TIMES DAILY PRN
Qty: 15 TABLET | Refills: 0 | Status: CANCELLED | OUTPATIENT
Start: 2025-07-02

## 2025-07-02 NOTE — TELEPHONE ENCOUNTER
Patient calling to request   Requested Prescriptions     Pending Prescriptions Disp Refills    ALPRAZolam (XANAX) 0.25 mg tablet 15 tablet 0     Sig: Take 1 tablet (0.25 mg total) by mouth 2 (two) times a day as needed for anxiety     To the [facility]

## 2025-07-03 DIAGNOSIS — N94.3 PMS (PREMENSTRUAL SYNDROME): ICD-10-CM

## 2025-07-03 RX ORDER — ALPRAZOLAM 0.25 MG
0.25 TABLET ORAL 2 TIMES DAILY PRN
Qty: 15 TABLET | Refills: 0 | Status: SHIPPED | OUTPATIENT
Start: 2025-07-03

## 2025-08-19 DIAGNOSIS — D50.0 IRON DEFICIENCY ANEMIA DUE TO CHRONIC BLOOD LOSS: Primary | ICD-10-CM

## 2025-08-19 RX ORDER — SODIUM CHLORIDE 9 MG/ML
20 INJECTION, SOLUTION INTRAVENOUS ONCE
Status: CANCELLED | OUTPATIENT
Start: 2025-08-25